# Patient Record
Sex: FEMALE | Race: WHITE | NOT HISPANIC OR LATINO | Employment: OTHER | URBAN - METROPOLITAN AREA
[De-identification: names, ages, dates, MRNs, and addresses within clinical notes are randomized per-mention and may not be internally consistent; named-entity substitution may affect disease eponyms.]

---

## 2019-01-09 LAB — EJECTION FRACTION: NORMAL %

## 2021-05-19 ENCOUNTER — TRANSFERRED RECORDS (OUTPATIENT)
Dept: HEALTH INFORMATION MANAGEMENT | Facility: CLINIC | Age: 71
End: 2021-05-19

## 2022-03-31 ENCOUNTER — TRANSFERRED RECORDS (OUTPATIENT)
Dept: HEALTH INFORMATION MANAGEMENT | Facility: CLINIC | Age: 72
End: 2022-03-31

## 2023-05-22 ENCOUNTER — TRANSFERRED RECORDS (OUTPATIENT)
Dept: HEALTH INFORMATION MANAGEMENT | Facility: CLINIC | Age: 73
End: 2023-05-22
Payer: MEDICARE

## 2023-05-22 ENCOUNTER — MEDICAL CORRESPONDENCE (OUTPATIENT)
Dept: HEALTH INFORMATION MANAGEMENT | Facility: CLINIC | Age: 73
End: 2023-05-22
Payer: MEDICARE

## 2023-05-31 ENCOUNTER — OFFICE VISIT (OUTPATIENT)
Dept: CARDIOLOGY | Facility: CLINIC | Age: 73
End: 2023-05-31
Payer: MEDICARE

## 2023-05-31 VITALS
HEART RATE: 67 BPM | RESPIRATION RATE: 16 BRPM | DIASTOLIC BLOOD PRESSURE: 50 MMHG | SYSTOLIC BLOOD PRESSURE: 90 MMHG | HEIGHT: 66 IN | WEIGHT: 153 LBS | BODY MASS INDEX: 24.59 KG/M2

## 2023-05-31 DIAGNOSIS — I50.22 CHRONIC SYSTOLIC CONGESTIVE HEART FAILURE (H): ICD-10-CM

## 2023-05-31 DIAGNOSIS — R06.09 EXERTIONAL DYSPNEA: ICD-10-CM

## 2023-05-31 DIAGNOSIS — I51.7 LVH (LEFT VENTRICULAR HYPERTROPHY): ICD-10-CM

## 2023-05-31 DIAGNOSIS — I42.8 NONISCHEMIC CARDIOMYOPATHY (H): Primary | ICD-10-CM

## 2023-05-31 DIAGNOSIS — I44.7 LBBB (LEFT BUNDLE BRANCH BLOCK): ICD-10-CM

## 2023-05-31 PROCEDURE — 99204 OFFICE O/P NEW MOD 45 MIN: CPT | Performed by: INTERNAL MEDICINE

## 2023-05-31 RX ORDER — ZOLPIDEM TARTRATE 5 MG/1
5 TABLET ORAL
COMMUNITY

## 2023-05-31 RX ORDER — SACUBITRIL AND VALSARTAN 49; 51 MG/1; MG/1
1 TABLET, FILM COATED ORAL 2 TIMES DAILY
COMMUNITY
Start: 2022-09-22 | End: 2023-08-31

## 2023-05-31 RX ORDER — MIRABEGRON 25 MG/1
1 TABLET, FILM COATED, EXTENDED RELEASE ORAL DAILY
COMMUNITY
Start: 2023-02-07 | End: 2023-08-31 | Stop reason: ALTCHOICE

## 2023-05-31 RX ORDER — METOPROLOL SUCCINATE 200 MG/1
1 TABLET, EXTENDED RELEASE ORAL DAILY
COMMUNITY
Start: 2023-05-16 | End: 2023-05-31

## 2023-05-31 RX ORDER — FUROSEMIDE 20 MG
2 TABLET ORAL DAILY
COMMUNITY
Start: 2022-07-26 | End: 2023-07-10 | Stop reason: DRUGHIGH

## 2023-05-31 RX ORDER — METOPROLOL SUCCINATE 100 MG/1
100 TABLET, EXTENDED RELEASE ORAL DAILY
Qty: 90 TABLET | Refills: 3 | Status: SHIPPED | OUTPATIENT
Start: 2023-05-31 | End: 2023-07-10

## 2023-05-31 RX ORDER — SPIRONOLACTONE 25 MG/1
12.5 TABLET ORAL DAILY
COMMUNITY
Start: 2021-09-03 | End: 2024-05-06

## 2023-05-31 NOTE — PATIENT INSTRUCTIONS
Please contact direct nurses line Monday through Friday 8 AM to 5 PM @ [phone]  After-hours contact cardiology office at [phone].    Reduce metoprolol XL to 100 mg daily   Continue other medications at current dose.    Follow-up in 3 months

## 2023-05-31 NOTE — PROGRESS NOTES
HEART CARE ENCOUNTER CONSULTATON NOTE      [facility]  [phone]      Assessment/Recommendations   Assessment:   1.  Nonischemic cardiomyopathy, heart failure with improved ejection fraction (HFimEF). LVEF:50%. NYHA II.  Because of nonischemic cardiomyopathy likely LV dyssynchrony from left bundle branch block.  2.  Exertional dyspnea.  Fatigue with prolonged walking.  3.  Chronic left bundle branch block, QRS: 138 ms.   4.  Mild left ventricular hypertrophy    Plan:   1. Reduce metoprolol XL to 100 mg daily given exertional intolerance.   2. Lasix 40 mg daily  3. Sacubitril-valsartan (ENTRESTO) 49-51 MG tablet BID (refilled)  4. Aldactone 12.5 mg daily  5. Call instructions provided to nurse including my direct nurses line. On-call cardiology line given.     Follow-up in 3 months        History of Present Illness/Subjective    HPI: Alondra White is a 72 year old female history of nonischemic cardiomyopathy, left bundle branch block, congestive heart failure who presents to [place] physician cardiology office for consultation for management of nonischemic cardiomyopathy.    In 2018, the patient was diagnosed with nonischemic cardiomyopathy in the setting of congestive heart failure with reduced ejection fraction.  Her initial symptoms were dyspnea on exertion and fatigue.  She underwent further evaluation including coronary angiogram which demonstrated normal coronary arteries.  She underwent cardiac MRI which demonstrated normal myocardium with the exception of insertion point fibrosis.  She was placed on guideline directed medical therapy at [facility] including Entresto, metoprolol, spironolactone which resulted in gradual improvement in left ventricular ejection fraction.      Currently she notes dyspnea with prolonged activity.  Prior to diagnosis of congestive heart failure she was able to walk 1 mile near [place] in [place].  Currently she is unable to walk more than half a  "mile before noticing fatigue and some shortness of breath.  She denies any orthopnea or PND symptoms.  No lightheadedness.  No syncope.  No history of ventricular arrhythmias.  She denies any anginal chest pain.  She is compliant with all medications.    Echocardiogram Results: 2022  Summary     1. The estimated ejection fraction is 50%.  The left ventricular size is   normal.  Systolic function is normal.  Wall thickness is mildly increased.   Left ventricular segmental wall motion is grossly normal. Abnormal septal   wall motion related to LBBB.     2. Grade 1 diastolic filling pattern (impaired relaxation with low to   normal   filling pressure).     3. Grossly normal RV size and function.     4. Normal biatrial size.     5. Lipomatous hypertrophy of the interatrial septum.     6. The mitral valve has thickened leaflets.     7. There is mild mitral valve regurgitation.     8. Compared to prior TTE study, the LVEF is unchanged.      Coronary Angiogram: 7/30/2018   Conclusions        1.  Normal coronary arteries. Left dominant.        2.  Normal filling pressures        RA mean 5mmHg        RV 26/6mmHg        PA 25/6, mean 14mmHg        PCWP mean 12mmHg        LVEDP 12mmHg      Cardiac MRI: 2018 ([facility])  Conclusions:  Severe LV systolic dysfunction, EF 27 %. The ventricle is eccentrically   remodeled and wall motion is  dyssynergic.      Areas of delayed enhancement in the septal mid myocardium and at the   septal/RV insertion point. These      are in a nonischemic pattern but are not pathognomonic for any specific   etiology.    No significant valve lesions.    Normal RV systolic function.        Physical Examination  Review of Systems   Vitals: BP 90/50 (BP Location: Right arm, Patient Position: Sitting, Cuff Size: Adult Regular)   Pulse 67   Resp 16   Ht 1.676 m (5' 6\")   Wt 69.4 kg (153 lb)   BMI 24.69 kg/m    BMI= Body mass index is 24.69 kg/m .  Wt Readings from Last 3 Encounters:   05/31/23 " 69.4 kg (153 lb)   07/20/16 68.5 kg (151 lb)   02/25/14 68.3 kg (150 lb 9.6 oz)        Pleasant   ENT/Mouth: membranes moist, no oral lesions or bleeding gums.      EYES:  no scleral icterus, normal conjunctivae       Chest/Lungs:   lungs are clear to auscultation, no rales or wheezing, no sternal scar, equal chest wall expansion    Cardiovascular:   Regular. Normal first and second heart sounds with no murmurs, rubs, or gallops; the carotid, radial and posterior tibial pulses are intact, Jugular venous pressure normal, no edema bilaterally    Abdomen:  no tenderness; bowel sounds are present   Extremities: no cyanosis or clubbing   Skin: no xanthelasma, warm.    Neurologic: normal  bilateral, no tremors     Psychiatric: alert and oriented x3, calm        Please refer above for cardiac ROS details.        Medical History  Surgical History Family History Social History   Past Medical History:   Diagnosis Date     LBBB (left bundle branch block)      Non-ischemic cardiomyopathy (H)      Past Surgical History:   Procedure Laterality Date     ARTHROPLASTY KNEE UNICOMPARTMENT Left 07/20/2016    Procedure: LEFT KNEE MEDIAL UNICOMPARTMENTAL ARTHROPLASTY;  Surgeon: Marko Norris MD;  Location: [facility];  Service:      ARTHROSCOPY KNEE       CV CORONARY ANGIOGRAM  2018    Normal Coronary Arteries.     HYSTERECTOMY       RELEASE CARPAL TUNNEL       TONSILLECTOMY       Family History   Problem Relation Age of Onset     Coronary Artery Disease Mother 71        MI     Chronic Obstructive Pulmonary Disease Father 80     Coronary Artery Disease Father 90        MI     Diabetes Type 2  Sister         Social History     Socioeconomic History     Marital status:      Spouse name: Not on file     Number of children: Not on file     Years of education: Not on file     Highest education level: Not on file   Occupational History     Not on file   Tobacco Use     Smoking status: Never     Smokeless tobacco: Not on file    Vaping Use     Vaping status: Not on file   Substance and Sexual Activity     Alcohol use: Yes     Comment: Alcoholic Drinks/day: one per day     Drug use: No     Sexual activity: Not on file   Other Topics Concern     Not on file   Social History Narrative     Not on file     Social Determinants of Health     Financial Resource Strain: Not on file   Food Insecurity: Not on file   Transportation Needs: Not on file   Physical Activity: Not on file   Stress: Not on file   Social Connections: Not on file   Intimate Partner Violence: Not on file   Housing Stability: Not on file           Medications  Allergies   Current Outpatient Medications   Medication Sig Dispense Refill     aspirin 325 MG EC tablet [ASPIRIN 325 MG EC TABLET] Take 1 tablet (325 mg total) by mouth 2 (two) times a day. Must take for 30 days for dvt prophylaxis. Take with meals  0     clonazePAM (KLONOPIN) 0.5 MG tablet [CLONAZEPAM (KLONOPIN) 0.5 MG TABLET] Take 0.5 mg by mouth 2 (two) times a day as needed for anxiety.       docusate sodium (COLACE) 100 MG capsule [DOCUSATE SODIUM (COLACE) 100 MG CAPSULE] Take 1 capsule (100 mg total) by mouth 2 (two) times a day. 10 capsule 0     DULoxetine (CYMBALTA) 30 MG capsule [DULOXETINE (CYMBALTA) 30 MG CAPSULE] Take 30 mg by mouth daily. At around 1:00pm       DULoxetine (CYMBALTA) 30 MG capsule [DULOXETINE (CYMBALTA) 30 MG CAPSULE] Take 60 mg by mouth daily. In AM       omeprazole (PRILOSEC) 20 MG capsule [OMEPRAZOLE (PRILOSEC) 20 MG CAPSULE] Take 20 mg by mouth daily.       oxybutynin (DITROPAN XL) 10 MG ER tablet [OXYBUTYNIN (DITROPAN XL) 10 MG ER TABLET] Take 10 mg by mouth daily.       psyllium (METAMUCIL) 3.4 gram packet [PSYLLIUM (METAMUCIL) 3.4 GRAM PACKET] Take 1 packet by mouth daily.       senna-docusate (PERICOLACE) 8.6-50 mg tablet [SENNA-DOCUSATE (PERICOLACE) 8.6-50 MG TABLET] Take 1 tablet by mouth 2 (two) times a day. Take daily for constipation or while on narcotics. Hold if developing  loose stools  0     traZODone (DESYREL) 50 MG tablet [TRAZODONE (DESYREL) 50 MG TABLET] Take 100-150 mg by mouth bedtime.          Allergies   Allergen Reactions     Paxil [Paroxetine] Nausea     Zoloft [Sertraline] Nausea          Lab Results    Chemistry/lipid CBC Cardiac Enzymes/BNP/TSH/INR   No results for input(s): CHOL, HDL, LDL, TRIG, CHOLHDLRATIO in the last 91444 hours.  No results for input(s): LDL in the last 77148 hours.  No results for input(s): NA, POTASSIUM, CHLORIDE, CO2, GLC, BUN, CR, GFRESTIMATED, ESTHELA in the last 44444 hours.    Invalid input(s): GRFESTBLACK  No results for input(s): CR in the last 21862 hours.  No results for input(s): A1C in the last 86168 hours.       No results for input(s): WBC, HGB, HCT, MCV, PLT in the last 83464 hours.  No results for input(s): HGB in the last 38904 hours. No results for input(s): TROPONINI in the last 90857 hours.  No results for input(s): BNP, NTBNPI, NTBNP in the last 08247 hours.  No results for input(s): TSH in the last 44263 hours.  No results for input(s): INR in the last 53933 hours.     Jasmeet Chaves DO

## 2023-06-04 ENCOUNTER — HEALTH MAINTENANCE LETTER (OUTPATIENT)
Age: 73
End: 2023-06-04

## 2023-06-16 ENCOUNTER — MYC MEDICAL ADVICE (OUTPATIENT)
Dept: CARDIOLOGY | Facility: CLINIC | Age: 73
End: 2023-06-16
Payer: MEDICARE

## 2023-06-16 DIAGNOSIS — I50.22 CHRONIC SYSTOLIC CONGESTIVE HEART FAILURE (H): Primary | ICD-10-CM

## 2023-06-16 NOTE — TELEPHONE ENCOUNTER
===View-only below this line===  ----- Message -----  From: Jasmeet Chaves DO  Sent: 6/16/2023   3:43 PM CDT  To: Richar Rowley RN  Subject: FW: Scheduled knee replacement surgery           August slot is fine.  Would recommend complete echo.  Indication: HFrEF.      Order placed. My chart message sent. CMM,Rn

## 2023-07-07 ENCOUNTER — TRANSFERRED RECORDS (OUTPATIENT)
Dept: HEALTH INFORMATION MANAGEMENT | Facility: CLINIC | Age: 73
End: 2023-07-07

## 2023-07-10 ENCOUNTER — MYC MEDICAL ADVICE (OUTPATIENT)
Dept: CARDIOLOGY | Facility: CLINIC | Age: 73
End: 2023-07-10
Payer: MEDICARE

## 2023-07-10 DIAGNOSIS — I42.8 NONISCHEMIC CARDIOMYOPATHY (H): Primary | ICD-10-CM

## 2023-07-10 DIAGNOSIS — I44.7 LBBB (LEFT BUNDLE BRANCH BLOCK): ICD-10-CM

## 2023-07-10 RX ORDER — METOPROLOL SUCCINATE 50 MG/1
50 TABLET, EXTENDED RELEASE ORAL DAILY
Qty: 90 TABLET | Refills: 1 | Status: SHIPPED | OUTPATIENT
Start: 2023-07-10 | End: 2024-06-20

## 2023-07-10 RX ORDER — FUROSEMIDE 20 MG
20 TABLET ORAL DAILY
Qty: 90 TABLET | Refills: 1 | Status: SHIPPED | OUTPATIENT
Start: 2023-07-10 | End: 2023-08-31

## 2023-07-10 NOTE — TELEPHONE ENCOUNTER
===View-only below this line===  ----- Message -----  From: Jasmeet Chaves DO  Sent: 7/10/2023   3:07 PM CDT  To: Richar Rowley RN  Subject: FW: Blood pressure test results                  I would have patient reduce lasix to 20 mg daily and reduce metoprolol XL to 50 mg daily.     My chart message sent. New prescriptions sent to [facility] to reflect change. DARLYNRn

## 2023-08-07 ENCOUNTER — HOSPITAL ENCOUNTER (OUTPATIENT)
Dept: CARDIOLOGY | Facility: CLINIC | Age: 73
Discharge: HOME | End: 2023-08-07
Attending: INTERNAL MEDICINE | Admitting: INTERNAL MEDICINE
Payer: MEDICARE

## 2023-08-07 DIAGNOSIS — I50.22 CHRONIC SYSTOLIC CONGESTIVE HEART FAILURE (H): ICD-10-CM

## 2023-08-07 LAB — LVEF ECHO: NORMAL

## 2023-08-07 PROCEDURE — 93306 TTE W/DOPPLER COMPLETE: CPT | Mod: 26 | Performed by: GENERAL ACUTE CARE HOSPITAL

## 2023-08-07 PROCEDURE — 93306 TTE W/DOPPLER COMPLETE: CPT

## 2023-08-21 ENCOUNTER — TRANSFERRED RECORDS (OUTPATIENT)
Dept: HEALTH INFORMATION MANAGEMENT | Facility: CLINIC | Age: 73
End: 2023-08-21
Payer: MEDICARE

## 2023-08-21 LAB
ALT SERPL-CCNC: 28 U/L
AST SERPL-CCNC: 26 U/L (ref 14–36)
CREATININE (EXTERNAL): 0.5 MG/DL (ref 0.7–1.4)
GFR ESTIMATED (EXTERNAL): >60 ML/MIN/1.73M2
GLUCOSE (EXTERNAL): 93 MG/DL (ref 60–99)
POTASSIUM (EXTERNAL): 4.6 MMOL/L (ref 3.5–5.1)

## 2023-08-31 ENCOUNTER — OFFICE VISIT (OUTPATIENT)
Dept: CARDIOLOGY | Facility: CLINIC | Age: 73
End: 2023-08-31
Attending: INTERNAL MEDICINE
Payer: MEDICARE

## 2023-08-31 VITALS
HEART RATE: 100 BPM | SYSTOLIC BLOOD PRESSURE: 106 MMHG | DIASTOLIC BLOOD PRESSURE: 62 MMHG | HEIGHT: 66 IN | BODY MASS INDEX: 24.65 KG/M2 | RESPIRATION RATE: 16 BRPM | WEIGHT: 153.4 LBS

## 2023-08-31 DIAGNOSIS — I42.8 NONISCHEMIC CARDIOMYOPATHY (H): ICD-10-CM

## 2023-08-31 DIAGNOSIS — I50.22 CHRONIC SYSTOLIC CONGESTIVE HEART FAILURE (H): Primary | ICD-10-CM

## 2023-08-31 DIAGNOSIS — I44.7 LBBB (LEFT BUNDLE BRANCH BLOCK): ICD-10-CM

## 2023-08-31 PROCEDURE — 99214 OFFICE O/P EST MOD 30 MIN: CPT | Performed by: INTERNAL MEDICINE

## 2023-08-31 RX ORDER — SOLIFENACIN SUCCINATE 5 MG/1
5 TABLET, FILM COATED ORAL
COMMUNITY
Start: 2023-08-17 | End: 2024-05-06

## 2023-08-31 RX ORDER — ACETAMINOPHEN 500 MG
500 TABLET ORAL EVERY 6 HOURS PRN
COMMUNITY
End: 2024-05-06

## 2023-08-31 RX ORDER — CLOBETASOL PROPIONATE 0.5 MG/G
OINTMENT TOPICAL 2 TIMES DAILY PRN
COMMUNITY
Start: 2023-05-22

## 2023-08-31 RX ORDER — SACUBITRIL AND VALSARTAN 49; 51 MG/1; MG/1
1 TABLET, FILM COATED ORAL 2 TIMES DAILY
Qty: 180 TABLET | Refills: 3 | Status: SHIPPED | OUTPATIENT
Start: 2023-08-31 | End: 2024-05-06

## 2023-08-31 RX ORDER — ATORVASTATIN CALCIUM 10 MG/1
10 TABLET, FILM COATED ORAL DAILY
COMMUNITY

## 2023-08-31 NOTE — PROGRESS NOTES
HEART CARE ENCOUNTER CONSULTATON NOTE      [facility]  [phone]      Assessment/Recommendations   Assessment:   1.  History of nonischemic cardiomyopathy, heart failure with improved ejection fraction (HFimEF). LVEF:60% (resting), NYHA II.  Nonischemic cardiomyopathy likely LV dyssynchrony from left bundle branch block.  2.  Exertional dyspnea (mild chronic and stable).  3.  Chronic left bundle branch block, QRS: 138 ms.   4.  Mild left ventricular hypertrophy    Plan:   1. Metoprolol XL 50 mg daily given exertional intolerance.   2. Off lasix 40 mg daily  3.  Continue Sacubitril-valsartan (ENTRESTO) 49-51 MG tablet BID (refilled)  4. Aldactone 12.5 mg daily      1.  Preoperative cardiovascular risk assessment: Orthopedic surgery  Assessment of preoperative cardiac risk: She has no active cardiac conditions (unstable coronary syndromes, decompensated HF, significant arrhythmias, or severe valvular disease), has no known coronary artery disease, has 1 clinical risk factors (ischemic heart disease, prior heart failure, cerebrovascular disease, diabetes mellitus, and renal insufficiency), and has a functional capacity >4 than 4 METs.       Surgical Recommendation(s):   1. Based on clinical risk and cardiac condition it is recommended that the patient proceeds with operation without further cardiac testing or interventions at this time from a cardiovascular standpoint.   2. Can Hold Entresto in AM of surgery to avoid hypotension.        History of Present Illness/Subjective    HPI: Alondra White is a 72 year old female history of nonischemic cardiomyopathy, left bundle branch block, congestive heart failure who presents to [facility] cardiology office for consultation for management of nonischemic cardiomyopathy.    In 2018, the patient was diagnosed with nonischemic cardiomyopathy in the setting of congestive heart failure with reduced ejection fraction.  Her initial symptoms were  dyspnea on exertion and fatigue.  She underwent further evaluation including coronary angiogram which demonstrated normal coronary arteries.  She underwent cardiac MRI which demonstrated normal myocardium with the exception of insertion point fibrosis.  She was placed on guideline directed medical therapy at [facility] including Entresto, metoprolol, spironolactone which resulted in gradual improvement in left ventricular ejection fraction.  Recent echocardiogram demonstrated normalization of left ventricular function.  Ejection fraction 60%.  Normal left ventricular size.    Currently from a cardiovascular standpoint the patient has been stable.  She currently denies any resting dyspnea, orthopnea or lower extremity edema.  No lightheadedness or palpitations.  She continues to be able to walk about half a mile without any cardiac symptoms.  She plans undergo knee surgery on September 5.      Echo: 2023  1. Left ventricular size, wall thickness and systolic function are normal. The  estimated left ventricular ejection fraction is 55-60%.  2. Right ventricular size and systolic function are normal.  3. No hemodynamically significant valvular abnormalities.  4. No prior study available for comparison.    Echocardiogram Results: 2022  Summary     1. The estimated ejection fraction is 50%.  The left ventricular size is   normal.  Systolic function is normal.  Wall thickness is mildly increased.   Left ventricular segmental wall motion is grossly normal. Abnormal septal   wall motion related to LBBB.     2. Grade 1 diastolic filling pattern (impaired relaxation with low to   normal   filling pressure).     3. Grossly normal RV size and function.     4. Normal biatrial size.     5. Lipomatous hypertrophy of the interatrial septum.     6. The mitral valve has thickened leaflets.     7. There is mild mitral valve regurgitation.     8. Compared to prior TTE study, the LVEF is unchanged.      Coronary Angiogram: 7/30/2018    "Conclusions        1.  Normal coronary arteries. Left dominant.        2.  Normal filling pressures        RA mean 5mmHg        RV 26/6mmHg        PA 25/6, mean 14mmHg        PCWP mean 12mmHg        LVEDP 12mmHg      Cardiac MRI: 2018 ([facility])  Conclusions:  Severe LV systolic dysfunction, EF 27 %. The ventricle is eccentrically   remodeled and wall motion is  dyssynergic.      Areas of delayed enhancement in the septal mid myocardium and at the   septal/RV insertion point. These      are in a nonischemic pattern but are not pathognomonic for any specific   etiology.    No significant valve lesions.    Normal RV systolic function.        Physical Examination  Review of Systems   Vitals: /62 (BP Location: Right arm, Patient Position: Sitting, Cuff Size: Adult Regular)   Pulse 100   Resp 16   Ht 1.676 m (5' 6\")   Wt 69.6 kg (153 lb 6.4 oz)   BMI 24.76 kg/m    BMI= Body mass index is 24.76 kg/m .  Wt Readings from Last 3 Encounters:   08/31/23 69.6 kg (153 lb 6.4 oz)   05/31/23 69.4 kg (153 lb)   07/20/16 68.5 kg (151 lb)        Pleasant   ENT/Mouth: membranes moist, no oral lesions or bleeding gums.      EYES:  no scleral icterus, normal conjunctivae       Chest/Lungs:   lungs are clear to auscultation, no rales or wheezing, no sternal scar, equal chest wall expansion    Cardiovascular:   Regular. Normal first and second heart sounds with no murmurs, rubs, or gallops; the carotid, radial and posterior tibial pulses are intact, Jugular venous pressure normal, no edema bilaterally. No change.    Abdomen:  no tenderness; bowel sounds are present   Extremities: no cyanosis or clubbing   Skin: no xanthelasma, warm.    Neurologic: normal  bilateral, no tremors     Psychiatric: alert and oriented x3, calm        Please refer above for cardiac ROS details.        Medical History  Surgical History Family History Social History   Past Medical History:   Diagnosis Date    LBBB (left bundle branch block)  "    Non-ischemic cardiomyopathy (H)      Past Surgical History:   Procedure Laterality Date    ARTHROPLASTY KNEE UNICOMPARTMENT Left 07/20/2016    Procedure: LEFT KNEE MEDIAL UNICOMPARTMENTAL ARTHROPLASTY;  Surgeon: Marko Norris MD;  Location: [facility] OR;  Service:     ARTHROSCOPY KNEE      CV CORONARY ANGIOGRAM  2018    Normal Coronary Arteries.    HYSTERECTOMY      RELEASE CARPAL TUNNEL      TONSILLECTOMY       Family History   Problem Relation Age of Onset    Coronary Artery Disease Mother 71        MI    Chronic Obstructive Pulmonary Disease Father 80    Coronary Artery Disease Father 90        MI    Diabetes Type 2  Sister         Social History     Socioeconomic History    Marital status:      Spouse name: Not on file    Number of children: Not on file    Years of education: Not on file    Highest education level: Not on file   Occupational History    Not on file   Tobacco Use    Smoking status: Never    Smokeless tobacco: Never   Vaping Use    Vaping Use: Never used   Substance and Sexual Activity    Alcohol use: Yes     Comment: Alcoholic Drinks/day: one per day    Drug use: No    Sexual activity: Not on file   Other Topics Concern    Not on file   Social History Narrative    Not on file     Social Determinants of Health     Financial Resource Strain: Not on file   Food Insecurity: Not on file   Transportation Needs: Not on file   Physical Activity: Not on file   Stress: Not on file   Social Connections: Not on file   Intimate Partner Violence: Not on file   Housing Stability: Not on file           Medications  Allergies   Current Outpatient Medications   Medication Sig Dispense Refill    acetaminophen (TYLENOL) 500 MG tablet Take 500 mg by mouth as needed      atorvastatin (LIPITOR) 10 MG tablet Take 10 mg by mouth daily      clobetasol (TEMOVATE) 0.05 % external ointment Apply topically 2 times daily      clonazePAM (KLONOPIN) 0.5 MG tablet [CLONAZEPAM (KLONOPIN) 0.5 MG TABLET] Take 0.5 mg by  mouth 2 (two) times a day as needed for anxiety.      diclofenac (VOLTAREN) 1 % topical gel Apply 2 g topically 4 times daily as needed      DULoxetine (CYMBALTA) 30 MG capsule Take 30 mg by mouth every evening      DULoxetine (CYMBALTA) 60 MG capsule Take 60 mg by mouth every morning      metoprolol succinate ER (TOPROL XL) 50 MG 24 hr tablet Take 1 tablet (50 mg) by mouth daily 90 tablet 1    omeprazole (PRILOSEC) 20 MG capsule [OMEPRAZOLE (PRILOSEC) 20 MG CAPSULE] Take 20 mg by mouth daily.      oxybutynin (DITROPAN XL) 10 MG ER tablet [OXYBUTYNIN (DITROPAN XL) 10 MG ER TABLET] Take 10 mg by mouth daily.      psyllium (METAMUCIL) 3.4 gram packet [PSYLLIUM (METAMUCIL) 3.4 GRAM PACKET] Take 1 packet by mouth daily.      sacubitril-valsartan (ENTRESTO) 49-51 MG per tablet Take 1 tablet by mouth 2 times daily      solifenacin (VESICARE) 5 MG tablet Take 5 mg by mouth daily at 2 pm      spironolactone (ALDACTONE) 25 MG tablet Take 12.5 mg by mouth daily      zolpidem (AMBIEN) 5 MG tablet Take 5 mg by mouth nightly as needed      docusate sodium (COLACE) 100 MG capsule [DOCUSATE SODIUM (COLACE) 100 MG CAPSULE] Take 1 capsule (100 mg total) by mouth 2 (two) times a day. 10 capsule 0       Allergies   Allergen Reactions    Paxil [Paroxetine] Nausea    Zoloft [Sertraline] Nausea          Lab Results    Chemistry/lipid CBC Cardiac Enzymes/BNP/TSH/INR   No results for input(s): CHOL, HDL, LDL, TRIG, CHOLHDLRATIO in the last 36131 hours.  No results for input(s): LDL in the last 51690 hours.  No results for input(s): NA, POTASSIUM, CHLORIDE, CO2, GLC, BUN, CR, GFRESTIMATED, ESTHELA in the last 58977 hours.    Invalid input(s): GRFESTBLACK  No results for input(s): CR in the last 50910 hours.  No results for input(s): A1C in the last 26748 hours.       No results for input(s): WBC, HGB, HCT, MCV, PLT in the last 57691 hours.  No results for input(s): HGB in the last 31915 hours. No results for input(s): TROPONINI in the last  29998 hours.  No results for input(s): BNP, NTBNPI, NTBNP in the last 32180 hours.  No results for input(s): TSH in the last 98752 hours.  No results for input(s): INR in the last 16053 hours.     Jasmeet Chaves, DO

## 2023-09-06 ENCOUNTER — ANESTHESIA (OUTPATIENT)
Dept: SURGERY | Facility: CLINIC | Age: 73
End: 2023-09-06
Payer: MEDICARE

## 2023-09-06 ENCOUNTER — HOSPITAL ENCOUNTER (OUTPATIENT)
Facility: CLINIC | Age: 73
Discharge: HOME | End: 2023-09-07
Attending: ORTHOPAEDIC SURGERY | Admitting: ORTHOPAEDIC SURGERY
Payer: MEDICARE

## 2023-09-06 ENCOUNTER — ANESTHESIA EVENT (OUTPATIENT)
Dept: SURGERY | Facility: CLINIC | Age: 73
End: 2023-09-06
Payer: MEDICARE

## 2023-09-06 DIAGNOSIS — M17.11 PRIMARY OSTEOARTHRITIS OF RIGHT KNEE: Primary | ICD-10-CM

## 2023-09-06 PROBLEM — M19.90 OA (OSTEOARTHRITIS): Status: ACTIVE | Noted: 2023-09-06

## 2023-09-06 PROBLEM — R35.0 URINARY FREQUENCY: Status: ACTIVE | Noted: 2023-09-06

## 2023-09-06 LAB — GLUCOSE BLDC GLUCOMTR-MCNC: 183 MG/DL (ref 70–99)

## 2023-09-06 PROCEDURE — 258N000003 HC RX IP 258 OP 636: Performed by: NURSE ANESTHETIST, CERTIFIED REGISTERED

## 2023-09-06 PROCEDURE — 250N000013 HC RX MED GY IP 250 OP 250 PS 637: Performed by: FAMILY MEDICINE

## 2023-09-06 PROCEDURE — 99204 OFFICE O/P NEW MOD 45 MIN: CPT | Performed by: FAMILY MEDICINE

## 2023-09-06 PROCEDURE — 258N000003 HC RX IP 258 OP 636: Performed by: ORTHOPAEDIC SURGERY

## 2023-09-06 PROCEDURE — 250N000013 HC RX MED GY IP 250 OP 250 PS 637: Performed by: PHYSICIAN ASSISTANT

## 2023-09-06 PROCEDURE — C1776 JOINT DEVICE (IMPLANTABLE): HCPCS | Performed by: ORTHOPAEDIC SURGERY

## 2023-09-06 PROCEDURE — 82962 GLUCOSE BLOOD TEST: CPT

## 2023-09-06 PROCEDURE — 370N000017 HC ANESTHESIA TECHNICAL FEE, PER MIN: Performed by: ORTHOPAEDIC SURGERY

## 2023-09-06 PROCEDURE — 272N000001 HC OR GENERAL SUPPLY STERILE: Performed by: ORTHOPAEDIC SURGERY

## 2023-09-06 PROCEDURE — 250N000011 HC RX IP 250 OP 636: Performed by: ANESTHESIOLOGY

## 2023-09-06 PROCEDURE — 710N000010 HC RECOVERY PHASE 1, LEVEL 2, PER MIN: Performed by: ORTHOPAEDIC SURGERY

## 2023-09-06 PROCEDURE — 258N000001 HC RX 258: Performed by: ORTHOPAEDIC SURGERY

## 2023-09-06 PROCEDURE — 250N000011 HC RX IP 250 OP 636: Mod: JZ | Performed by: ORTHOPAEDIC SURGERY

## 2023-09-06 PROCEDURE — 250N000013 HC RX MED GY IP 250 OP 250 PS 637: Performed by: ORTHOPAEDIC SURGERY

## 2023-09-06 PROCEDURE — 250N000011 HC RX IP 250 OP 636: Performed by: NURSE ANESTHETIST, CERTIFIED REGISTERED

## 2023-09-06 PROCEDURE — 258N000003 HC RX IP 258 OP 636: Performed by: ANESTHESIOLOGY

## 2023-09-06 PROCEDURE — C1713 ANCHOR/SCREW BN/BN,TIS/BN: HCPCS | Performed by: ORTHOPAEDIC SURGERY

## 2023-09-06 PROCEDURE — 999N000141 HC STATISTIC PRE-PROCEDURE NURSING ASSESSMENT: Performed by: ORTHOPAEDIC SURGERY

## 2023-09-06 PROCEDURE — 360N000077 HC SURGERY LEVEL 4, PER MIN: Performed by: ORTHOPAEDIC SURGERY

## 2023-09-06 PROCEDURE — 250N000011 HC RX IP 250 OP 636: Performed by: PHYSICIAN ASSISTANT

## 2023-09-06 PROCEDURE — 250N000009 HC RX 250: Performed by: PHYSICIAN ASSISTANT

## 2023-09-06 DEVICE — SIMPLEX® HV IS A FAST-SETTING ACRYLIC RESIN FOR USE IN BONE SURGERY. MIXING THE TWO SEPARATE STERILE COMPONENTS PRODUCES A DUCTILE BONE CEMENT WHICH, AFTER HARDENING, FIXES THE IMPLANT AND TRANSFERS STRESSES PRODUCED DURING MOVEMENT EVENLY TO THE BONE. SIMPLEX® HV CEMENT POWDER ALSO CONTAINS INSOLUBLE ZIRCONIUM DIOXIDE AS AN X-RAY CONTRAST MEDIUM. SIMPLEX® HV DOES NOT EMIT A SIGNAL AND DOES NOT POSE A SAFETY RISK IN A MAGNETIC RESONANCE ENVIRONMENT.
Type: IMPLANTABLE DEVICE | Site: KNEE | Status: FUNCTIONAL
Brand: SIMPLEX HV

## 2023-09-06 DEVICE — LEGION CR NONPOROUS FEMORAL SZ  4 RT
Type: IMPLANTABLE DEVICE | Site: KNEE | Status: FUNCTIONAL
Brand: LEGION

## 2023-09-06 DEVICE — GENESIS II NON-POROUS TIBIAL                                    BASEPLATE SIZE 3 RIGHT
Type: IMPLANTABLE DEVICE | Site: KNEE | Status: FUNCTIONAL
Brand: GENESIS II

## 2023-09-06 DEVICE — GENESIS II RESURFACING PATELLAR                                    PROSTHESIS  29MM
Type: IMPLANTABLE DEVICE | Site: KNEE | Status: FUNCTIONAL
Brand: GENESIS II

## 2023-09-06 DEVICE — LEGION CRUCIATE RETAINING HIGH                                    FLEX HIGHLY CROSS LINKED                                    POLYETHYLENE SIZE 3-4 13MM
Type: IMPLANTABLE DEVICE | Site: KNEE | Status: FUNCTIONAL
Brand: LEGION

## 2023-09-06 RX ORDER — CEFAZOLIN SODIUM 1 G/3ML
1 INJECTION, POWDER, FOR SOLUTION INTRAMUSCULAR; INTRAVENOUS EVERY 8 HOURS
Status: COMPLETED | OUTPATIENT
Start: 2023-09-06 | End: 2023-09-07

## 2023-09-06 RX ORDER — DULOXETIN HYDROCHLORIDE 30 MG/1
30 CAPSULE, DELAYED RELEASE ORAL EVERY EVENING
Status: DISCONTINUED | OUTPATIENT
Start: 2023-09-06 | End: 2023-09-07 | Stop reason: HOSPADM

## 2023-09-06 RX ORDER — AMOXICILLIN 250 MG
1 CAPSULE ORAL 2 TIMES DAILY
Status: DISCONTINUED | OUTPATIENT
Start: 2023-09-06 | End: 2023-09-07 | Stop reason: HOSPADM

## 2023-09-06 RX ORDER — PROPOFOL 10 MG/ML
INJECTION, EMULSION INTRAVENOUS CONTINUOUS PRN
Status: DISCONTINUED | OUTPATIENT
Start: 2023-09-06 | End: 2023-09-06

## 2023-09-06 RX ORDER — HYDROMORPHONE HCL IN WATER/PF 6 MG/30 ML
0.4 PATIENT CONTROLLED ANALGESIA SYRINGE INTRAVENOUS EVERY 5 MIN PRN
Status: DISCONTINUED | OUTPATIENT
Start: 2023-09-06 | End: 2023-09-06 | Stop reason: HOSPADM

## 2023-09-06 RX ORDER — OXYCODONE HYDROCHLORIDE 5 MG/1
10 TABLET ORAL EVERY 4 HOURS PRN
Status: DISCONTINUED | OUTPATIENT
Start: 2023-09-06 | End: 2023-09-07 | Stop reason: HOSPADM

## 2023-09-06 RX ORDER — HYDROMORPHONE HCL IN WATER/PF 6 MG/30 ML
0.4 PATIENT CONTROLLED ANALGESIA SYRINGE INTRAVENOUS
Status: DISCONTINUED | OUTPATIENT
Start: 2023-09-06 | End: 2023-09-07 | Stop reason: HOSPADM

## 2023-09-06 RX ORDER — CEFAZOLIN SODIUM/WATER 2 G/20 ML
2 SYRINGE (ML) INTRAVENOUS
Status: COMPLETED | OUTPATIENT
Start: 2023-09-06 | End: 2023-09-06

## 2023-09-06 RX ORDER — NALOXONE HYDROCHLORIDE 0.4 MG/ML
0.4 INJECTION, SOLUTION INTRAMUSCULAR; INTRAVENOUS; SUBCUTANEOUS
Status: DISCONTINUED | OUTPATIENT
Start: 2023-09-06 | End: 2023-09-07 | Stop reason: HOSPADM

## 2023-09-06 RX ORDER — POLYETHYLENE GLYCOL 3350 17 G/17G
17 POWDER, FOR SOLUTION ORAL DAILY
Status: DISCONTINUED | OUTPATIENT
Start: 2023-09-06 | End: 2023-09-07 | Stop reason: HOSPADM

## 2023-09-06 RX ORDER — FENTANYL CITRATE 50 UG/ML
50 INJECTION, SOLUTION INTRAMUSCULAR; INTRAVENOUS EVERY 5 MIN PRN
Status: DISCONTINUED | OUTPATIENT
Start: 2023-09-06 | End: 2023-09-06 | Stop reason: HOSPADM

## 2023-09-06 RX ORDER — MEPERIDINE HYDROCHLORIDE 25 MG/ML
12.5 INJECTION INTRAMUSCULAR; INTRAVENOUS; SUBCUTANEOUS EVERY 5 MIN PRN
Status: DISCONTINUED | OUTPATIENT
Start: 2023-09-06 | End: 2023-09-06 | Stop reason: HOSPADM

## 2023-09-06 RX ORDER — FENTANYL CITRATE 50 UG/ML
25 INJECTION, SOLUTION INTRAMUSCULAR; INTRAVENOUS EVERY 5 MIN PRN
Status: DISCONTINUED | OUTPATIENT
Start: 2023-09-06 | End: 2023-09-06 | Stop reason: HOSPADM

## 2023-09-06 RX ORDER — POLYETHYLENE GLYCOL 3350 17 G/17G
1 POWDER, FOR SOLUTION ORAL DAILY PRN
COMMUNITY

## 2023-09-06 RX ORDER — NALOXONE HYDROCHLORIDE 0.4 MG/ML
0.2 INJECTION, SOLUTION INTRAMUSCULAR; INTRAVENOUS; SUBCUTANEOUS
Status: DISCONTINUED | OUTPATIENT
Start: 2023-09-06 | End: 2023-09-07 | Stop reason: HOSPADM

## 2023-09-06 RX ORDER — ONDANSETRON 4 MG/1
4 TABLET, ORALLY DISINTEGRATING ORAL EVERY 6 HOURS PRN
Status: DISCONTINUED | OUTPATIENT
Start: 2023-09-06 | End: 2023-09-07 | Stop reason: HOSPADM

## 2023-09-06 RX ORDER — SODIUM CHLORIDE, SODIUM LACTATE, POTASSIUM CHLORIDE, CALCIUM CHLORIDE 600; 310; 30; 20 MG/100ML; MG/100ML; MG/100ML; MG/100ML
INJECTION, SOLUTION INTRAVENOUS CONTINUOUS
Status: DISCONTINUED | OUTPATIENT
Start: 2023-09-06 | End: 2023-09-06 | Stop reason: HOSPADM

## 2023-09-06 RX ORDER — HALOPERIDOL 5 MG/ML
1 INJECTION INTRAMUSCULAR
Status: DISCONTINUED | OUTPATIENT
Start: 2023-09-06 | End: 2023-09-06 | Stop reason: HOSPADM

## 2023-09-06 RX ORDER — DULOXETIN HYDROCHLORIDE 60 MG/1
60 CAPSULE, DELAYED RELEASE ORAL EVERY MORNING
Status: DISCONTINUED | OUTPATIENT
Start: 2023-09-07 | End: 2023-09-07 | Stop reason: HOSPADM

## 2023-09-06 RX ORDER — ATORVASTATIN CALCIUM 10 MG/1
10 TABLET, FILM COATED ORAL EVERY EVENING
Status: DISCONTINUED | OUTPATIENT
Start: 2023-09-07 | End: 2023-09-07 | Stop reason: HOSPADM

## 2023-09-06 RX ORDER — ZOLPIDEM TARTRATE 5 MG/1
5 TABLET ORAL
Status: DISCONTINUED | OUTPATIENT
Start: 2023-09-06 | End: 2023-09-07 | Stop reason: HOSPADM

## 2023-09-06 RX ORDER — ONDANSETRON 2 MG/ML
4 INJECTION INTRAMUSCULAR; INTRAVENOUS EVERY 30 MIN PRN
Status: DISCONTINUED | OUTPATIENT
Start: 2023-09-06 | End: 2023-09-06 | Stop reason: HOSPADM

## 2023-09-06 RX ORDER — TRANEXAMIC ACID 650 MG/1
1950 TABLET ORAL ONCE
Status: COMPLETED | OUTPATIENT
Start: 2023-09-06 | End: 2023-09-06

## 2023-09-06 RX ORDER — BUPIVACAINE HYDROCHLORIDE 5 MG/ML
INJECTION, SOLUTION EPIDURAL; INTRACAUDAL
Status: COMPLETED | OUTPATIENT
Start: 2023-09-06 | End: 2023-09-06

## 2023-09-06 RX ORDER — SODIUM CHLORIDE, SODIUM LACTATE, POTASSIUM CHLORIDE, CALCIUM CHLORIDE 600; 310; 30; 20 MG/100ML; MG/100ML; MG/100ML; MG/100ML
INJECTION, SOLUTION INTRAVENOUS CONTINUOUS
Status: DISCONTINUED | OUTPATIENT
Start: 2023-09-06 | End: 2023-09-07 | Stop reason: HOSPADM

## 2023-09-06 RX ORDER — ONDANSETRON 2 MG/ML
INJECTION INTRAMUSCULAR; INTRAVENOUS PRN
Status: DISCONTINUED | OUTPATIENT
Start: 2023-09-06 | End: 2023-09-06

## 2023-09-06 RX ORDER — PANTOPRAZOLE SODIUM 40 MG/1
40 TABLET, DELAYED RELEASE ORAL DAILY
Status: DISCONTINUED | OUTPATIENT
Start: 2023-09-06 | End: 2023-09-07 | Stop reason: HOSPADM

## 2023-09-06 RX ORDER — BISACODYL 10 MG
10 SUPPOSITORY, RECTAL RECTAL DAILY PRN
Status: DISCONTINUED | OUTPATIENT
Start: 2023-09-06 | End: 2023-09-07 | Stop reason: HOSPADM

## 2023-09-06 RX ORDER — LIDOCAINE 40 MG/G
CREAM TOPICAL
Status: DISCONTINUED | OUTPATIENT
Start: 2023-09-06 | End: 2023-09-07 | Stop reason: HOSPADM

## 2023-09-06 RX ORDER — HYDROMORPHONE HCL IN WATER/PF 6 MG/30 ML
0.2 PATIENT CONTROLLED ANALGESIA SYRINGE INTRAVENOUS
Status: DISCONTINUED | OUTPATIENT
Start: 2023-09-06 | End: 2023-09-07 | Stop reason: HOSPADM

## 2023-09-06 RX ORDER — ACETAMINOPHEN 325 MG/1
975 TABLET ORAL ONCE
Status: COMPLETED | OUTPATIENT
Start: 2023-09-06 | End: 2023-09-06

## 2023-09-06 RX ORDER — SPIRONOLACTONE 25 MG
12.5 TABLET ORAL DAILY
Status: DISCONTINUED | OUTPATIENT
Start: 2023-09-07 | End: 2023-09-07 | Stop reason: HOSPADM

## 2023-09-06 RX ORDER — ASPIRIN 81 MG/1
81 TABLET ORAL 2 TIMES DAILY
Status: DISCONTINUED | OUTPATIENT
Start: 2023-09-06 | End: 2023-09-07 | Stop reason: HOSPADM

## 2023-09-06 RX ORDER — ACETAMINOPHEN 325 MG/1
650 TABLET ORAL EVERY 4 HOURS PRN
Status: DISCONTINUED | OUTPATIENT
Start: 2023-09-09 | End: 2023-09-07 | Stop reason: HOSPADM

## 2023-09-06 RX ORDER — METOPROLOL SUCCINATE 50 MG/1
50 TABLET, EXTENDED RELEASE ORAL EVERY EVENING
Status: DISCONTINUED | OUTPATIENT
Start: 2023-09-07 | End: 2023-09-07 | Stop reason: HOSPADM

## 2023-09-06 RX ORDER — LIDOCAINE 40 MG/G
CREAM TOPICAL
Status: DISCONTINUED | OUTPATIENT
Start: 2023-09-06 | End: 2023-09-06

## 2023-09-06 RX ORDER — SODIUM CHLORIDE, SODIUM LACTATE, POTASSIUM CHLORIDE, CALCIUM CHLORIDE 600; 310; 30; 20 MG/100ML; MG/100ML; MG/100ML; MG/100ML
INJECTION, SOLUTION INTRAVENOUS CONTINUOUS
Status: DISCONTINUED | OUTPATIENT
Start: 2023-09-06 | End: 2023-09-06

## 2023-09-06 RX ORDER — DEXAMETHASONE SODIUM PHOSPHATE 10 MG/ML
INJECTION, SOLUTION INTRAMUSCULAR; INTRAVENOUS PRN
Status: DISCONTINUED | OUTPATIENT
Start: 2023-09-06 | End: 2023-09-06

## 2023-09-06 RX ORDER — FENTANYL CITRATE 50 UG/ML
25-100 INJECTION, SOLUTION INTRAMUSCULAR; INTRAVENOUS
Status: DISCONTINUED | OUTPATIENT
Start: 2023-09-06 | End: 2023-09-06

## 2023-09-06 RX ORDER — PROCHLORPERAZINE MALEATE 5 MG
5 TABLET ORAL EVERY 6 HOURS PRN
Status: DISCONTINUED | OUTPATIENT
Start: 2023-09-06 | End: 2023-09-07 | Stop reason: HOSPADM

## 2023-09-06 RX ORDER — OXYBUTYNIN CHLORIDE 5 MG/1
5 TABLET, EXTENDED RELEASE ORAL DAILY
Status: DISCONTINUED | OUTPATIENT
Start: 2023-09-06 | End: 2023-09-07 | Stop reason: HOSPADM

## 2023-09-06 RX ORDER — CALCIUM CARBONATE/VITAMIN D3 600 MG-10
1 TABLET ORAL 2 TIMES DAILY
COMMUNITY

## 2023-09-06 RX ORDER — POLYETHYLENE GLYCOL 3350 17 G/17G
17 POWDER, FOR SOLUTION ORAL DAILY
Status: DISCONTINUED | OUTPATIENT
Start: 2023-09-07 | End: 2023-09-06

## 2023-09-06 RX ORDER — ONDANSETRON 4 MG/1
4 TABLET, ORALLY DISINTEGRATING ORAL EVERY 30 MIN PRN
Status: DISCONTINUED | OUTPATIENT
Start: 2023-09-06 | End: 2023-09-06 | Stop reason: HOSPADM

## 2023-09-06 RX ORDER — ACETAMINOPHEN 325 MG/1
975 TABLET ORAL EVERY 8 HOURS
Status: DISCONTINUED | OUTPATIENT
Start: 2023-09-06 | End: 2023-09-07 | Stop reason: HOSPADM

## 2023-09-06 RX ORDER — ONDANSETRON 2 MG/ML
4 INJECTION INTRAMUSCULAR; INTRAVENOUS EVERY 6 HOURS PRN
Status: DISCONTINUED | OUTPATIENT
Start: 2023-09-06 | End: 2023-09-07 | Stop reason: HOSPADM

## 2023-09-06 RX ORDER — SOLIFENACIN SUCCINATE 5 MG/1
5 TABLET, FILM COATED ORAL
Status: DISCONTINUED | OUTPATIENT
Start: 2023-09-06 | End: 2023-09-07 | Stop reason: HOSPADM

## 2023-09-06 RX ORDER — OXYCODONE HYDROCHLORIDE 5 MG/1
5 TABLET ORAL EVERY 4 HOURS PRN
Status: DISCONTINUED | OUTPATIENT
Start: 2023-09-06 | End: 2023-09-07 | Stop reason: HOSPADM

## 2023-09-06 RX ORDER — HYDROMORPHONE HCL IN WATER/PF 6 MG/30 ML
0.2 PATIENT CONTROLLED ANALGESIA SYRINGE INTRAVENOUS EVERY 5 MIN PRN
Status: DISCONTINUED | OUTPATIENT
Start: 2023-09-06 | End: 2023-09-06 | Stop reason: HOSPADM

## 2023-09-06 RX ORDER — CEFAZOLIN SODIUM/WATER 2 G/20 ML
2 SYRINGE (ML) INTRAVENOUS SEE ADMIN INSTRUCTIONS
Status: DISCONTINUED | OUTPATIENT
Start: 2023-09-06 | End: 2023-09-06

## 2023-09-06 RX ORDER — CLONAZEPAM 0.5 MG/1
0.5 TABLET ORAL
Status: DISCONTINUED | OUTPATIENT
Start: 2023-09-06 | End: 2023-09-07 | Stop reason: HOSPADM

## 2023-09-06 RX ADMIN — SODIUM CHLORIDE, POTASSIUM CHLORIDE, SODIUM LACTATE AND CALCIUM CHLORIDE: 600; 310; 30; 20 INJECTION, SOLUTION INTRAVENOUS at 13:10

## 2023-09-06 RX ADMIN — DULOXETINE 30 MG: 30 CAPSULE, DELAYED RELEASE ORAL at 20:38

## 2023-09-06 RX ADMIN — BUPIVACAINE HYDROCHLORIDE 15 ML: 5 INJECTION, SOLUTION EPIDURAL; INTRACAUDAL; PERINEURAL at 13:14

## 2023-09-06 RX ADMIN — TRANEXAMIC ACID 1950 MG: 650 TABLET ORAL at 11:25

## 2023-09-06 RX ADMIN — PROPOFOL 80 MCG/KG/MIN: 10 INJECTION, EMULSION INTRAVENOUS at 14:02

## 2023-09-06 RX ADMIN — ZOLPIDEM TARTRATE 5 MG: 5 TABLET ORAL at 23:18

## 2023-09-06 RX ADMIN — PHENYLEPHRINE HYDROCHLORIDE 200 MCG: 10 INJECTION INTRAVENOUS at 15:02

## 2023-09-06 RX ADMIN — CEFAZOLIN 1 G: 1 INJECTION, POWDER, FOR SOLUTION INTRAMUSCULAR; INTRAVENOUS at 21:50

## 2023-09-06 RX ADMIN — OXYCODONE HYDROCHLORIDE 5 MG: 5 TABLET ORAL at 19:43

## 2023-09-06 RX ADMIN — HYDROMORPHONE HYDROCHLORIDE 0.4 MG: 0.2 INJECTION, SOLUTION INTRAMUSCULAR; INTRAVENOUS; SUBCUTANEOUS at 20:37

## 2023-09-06 RX ADMIN — DEXAMETHASONE SODIUM PHOSPHATE 6 MG: 10 INJECTION, SOLUTION INTRAMUSCULAR; INTRAVENOUS at 14:04

## 2023-09-06 RX ADMIN — Medication 1 TABLET: at 20:38

## 2023-09-06 RX ADMIN — Medication 2 G: at 13:53

## 2023-09-06 RX ADMIN — ONDANSETRON 4 MG: 2 INJECTION INTRAMUSCULAR; INTRAVENOUS at 14:02

## 2023-09-06 RX ADMIN — Medication 1 PACKET: at 17:40

## 2023-09-06 RX ADMIN — ACETAMINOPHEN 975 MG: 325 TABLET ORAL at 21:50

## 2023-09-06 RX ADMIN — ASPIRIN 81 MG: 81 TABLET, COATED ORAL at 20:38

## 2023-09-06 RX ADMIN — SODIUM CHLORIDE, POTASSIUM CHLORIDE, SODIUM LACTATE AND CALCIUM CHLORIDE: 600; 310; 30; 20 INJECTION, SOLUTION INTRAVENOUS at 15:05

## 2023-09-06 RX ADMIN — PHENYLEPHRINE HYDROCHLORIDE 200 MCG: 10 INJECTION INTRAVENOUS at 14:52

## 2023-09-06 RX ADMIN — PHENYLEPHRINE HYDROCHLORIDE 0.2 MCG/KG/MIN: 10 INJECTION INTRAVENOUS at 14:06

## 2023-09-06 RX ADMIN — MIDAZOLAM HYDROCHLORIDE 1 MG: 1 INJECTION, SOLUTION INTRAMUSCULAR; INTRAVENOUS at 13:09

## 2023-09-06 RX ADMIN — SENNOSIDES AND DOCUSATE SODIUM 1 TABLET: 50; 8.6 TABLET ORAL at 20:38

## 2023-09-06 RX ADMIN — ACETAMINOPHEN 975 MG: 325 TABLET ORAL at 11:25

## 2023-09-06 RX ADMIN — SODIUM CHLORIDE, POTASSIUM CHLORIDE, SODIUM LACTATE AND CALCIUM CHLORIDE: 600; 310; 30; 20 INJECTION, SOLUTION INTRAVENOUS at 16:36

## 2023-09-06 RX ADMIN — FENTANYL CITRATE 50 MCG: 50 INJECTION, SOLUTION INTRAMUSCULAR; INTRAVENOUS at 13:09

## 2023-09-06 RX ADMIN — POLYETHYLENE GLYCOL 3350 17 G: 17 POWDER, FOR SOLUTION ORAL at 17:40

## 2023-09-06 RX ADMIN — SODIUM CHLORIDE, POTASSIUM CHLORIDE, SODIUM LACTATE AND CALCIUM CHLORIDE: 600; 310; 30; 20 INJECTION, SOLUTION INTRAVENOUS at 18:18

## 2023-09-06 RX ADMIN — SACUBITRIL AND VALSARTAN 1 TABLET: 49; 51 TABLET, FILM COATED ORAL at 20:38

## 2023-09-06 RX ADMIN — PHENYLEPHRINE HYDROCHLORIDE 100 MCG: 10 INJECTION INTRAVENOUS at 14:32

## 2023-09-06 RX ADMIN — PANTOPRAZOLE SODIUM 40 MG: 40 TABLET, DELAYED RELEASE ORAL at 17:40

## 2023-09-06 ASSESSMENT — ACTIVITIES OF DAILY LIVING (ADL)
ADLS_ACUITY_SCORE: 24
ADLS_ACUITY_SCORE: 26
ADLS_ACUITY_SCORE: 26
ADLS_ACUITY_SCORE: 24
ADLS_ACUITY_SCORE: 33
ADLS_ACUITY_SCORE: 26
ADLS_ACUITY_SCORE: 26

## 2023-09-06 NOTE — ANESTHESIA PREPROCEDURE EVALUATION
Anesthesia Pre-Procedure Evaluation    Patient: Alondra White   MRN: [id number] : 1950        Procedure : Procedure(s):  RIGHT TOTAL KNEE ARTHROPLASTY          Past Medical History:   Diagnosis Date    Anemia     Arthritis     Congestive heart failure (H)     Gastroesophageal reflux disease     History of blood transfusion     Hypertension     LBBB (left bundle branch block)     Non-ischemic cardiomyopathy (H)       Past Surgical History:   Procedure Laterality Date    ARTHROPLASTY KNEE UNICOMPARTMENT Left 2016    Procedure: LEFT KNEE MEDIAL UNICOMPARTMENTAL ARTHROPLASTY;  Surgeon: Marko Norris MD;  Location: [facility] OR;  Service:     ARTHROSCOPY KNEE      CV CORONARY ANGIOGRAM  2018    Normal Coronary Arteries.    HYSTERECTOMY      RELEASE CARPAL TUNNEL      TONSILLECTOMY        Allergies   Allergen Reactions    Paxil [Paroxetine] Nausea    Zoloft [Sertraline] Nausea      Social History     Tobacco Use    Smoking status: Never    Smokeless tobacco: Never   Substance Use Topics    Alcohol use: Yes     Comment: Alcoholic Drinks/day: one per day      Wt Readings from Last 1 Encounters:   23 68.4 kg (150 lb 11.2 oz)        Anesthesia Evaluation   Pt has had prior anesthetic.     No history of anesthetic complications       ROS/MED HX  ENT/Pulmonary:  - neg pulmonary ROS     Neurologic:  - neg neurologic ROS     Cardiovascular: Comment: LBBB    (+) Dyslipidemia hypertension- -   -  - -      CHF                           Previous cardiac testing   Echo: Date: 23 Results:  Interpretation Summary     1. Left ventricular size, wall thickness and systolic function are normal. The  estimated left ventricular ejection fraction is 55-60%.  2. Right ventricular size and systolic function are normal.  3. No hemodynamically significant valvular abnormalities.  4. No prior study available for comparison.    Stress Test:  Date: Results:    ECG Reviewed:  Date: 3/31/22 Results:  Sinus  tachycardia  LBBB  Cath:  Date: Results:      METS/Exercise Tolerance:     Hematologic:     (+)      anemia,          Musculoskeletal:   (+)  arthritis,             GI/Hepatic:     (+) GERD, Asymptomatic on medication,                  Renal/Genitourinary:  - neg Renal ROS     Endo:  - neg endo ROS     Psychiatric/Substance Use:     (+) psychiatric history anxiety and depression       Infectious Disease:  - neg infectious disease ROS     Malignancy:  - neg malignancy ROS     Other:  - neg other ROS          Physical Exam    Airway  airway exam normal      Mallampati: II   TM distance: > 3 FB   Neck ROM: full   Mouth opening: > 3 cm    Respiratory Devices and Support         Dental  no notable dental history     (+) Minor Abnormalities - some fillings, tiny chips      Cardiovascular   cardiovascular exam normal       Rhythm and rate: regular and normal     Pulmonary   pulmonary exam normal        breath sounds clear to auscultation           OUTSIDE LABS:  CBC: No results found for: WBC, HGB, HCT, PLT  BMP: No results found for: NA, POTASSIUM, CHLORIDE, CO2, BUN, CR, GLC  COAGS: No results found for: PTT, INR, FIBR  POC: No results found for: BGM, HCG, HCGS  HEPATIC: No results found for: ALBUMIN, PROTTOTAL, ALT, AST, GGT, ALKPHOS, BILITOTAL, BILIDIRECT, LYDIA  OTHER: No results found for: PH, LACT, A1C, ESTHELA, PHOS, MAG, LIPASE, AMYLASE, TSH, T4, T3, CRP, SED    Anesthesia Plan    ASA Status:  2    NPO Status:  NPO Appropriate    Anesthesia Type: Spinal.              Consents    Anesthesia Plan(s) and associated risks, benefits, and realistic alternatives discussed. Questions answered and patient/representative(s) expressed understanding.     - Discussed:     - Discussed with:  Patient            Postoperative Care    Pain management: IV analgesics, Oral pain medications, Multi-modal analgesia, Peripheral nerve block (Single Shot).   PONV prophylaxis: Ondansetron (or other 5HT-3), Dexamethasone or Solumedrol,  Droperidol or Haldol     Comments:    Other Comments: Chart reviewed, including labs.  I reviewed the options and risks of a spinal with the patient, including but not limited to: bleeding, infection, damage to tissues under the skin (nerves, muscles, blood vessels), hypotension and headache. The patient's questions were answered and agrees to a spinal.    Plan for pre op adductor canal block               GRETTA KIDD MD

## 2023-09-06 NOTE — PHARMACY-ADMISSION MEDICATION HISTORY
Pharmacist Admission Medication History    Admission medication history is complete. The information provided in this note is only as accurate as the sources available at the time of the update.    Medication reconciliation/reorder completed by provider prior to medication history? No    Information Source(s): Patient and CareEverywhere/SureScripts via in-person    Pertinent Information: -    Changes made to PTA medication list:  Added: Ca/Vit D, Miralax  Deleted: docusate  Changed: oxybutynin from 10mg    Allergies reviewed with patient and updates made in EHR: yes    Medication History Completed By: Kenzie Markham PharmD 9/6/2023 12:03 PM    Prior to Admission medications    Medication Sig Last Dose Taking? Auth Provider Long Term End Date   acetaminophen (TYLENOL) 500 MG tablet Take 500 mg by mouth every 6 hours as needed Unknown at prn Yes Reported, Patient     atorvastatin (LIPITOR) 10 MG tablet Take 10 mg by mouth daily 9/5/2023 Yes Reported, Patient Yes    calcium carbonate-vitamin D (CALTRATE) 600-10 MG-MCG per tablet Take 1 tablet by mouth 2 times daily 9/5/2023 Yes Unknown, Entered By History     clobetasol (TEMOVATE) 0.05 % external ointment Apply topically 2 times daily as needed Unknown at prn - has with Yes Reported, Patient     clonazePAM (KLONOPIN) 0.5 MG tablet Take 0.5 mg by mouth nightly as needed Unknown at prn Yes Provider, Historical     diclofenac (VOLTAREN) 1 % topical gel Apply 2 g topically 4 times daily as needed Unknown at prn Yes Reported, Patient     DULoxetine (CYMBALTA) 30 MG capsule Take 30 mg by mouth every evening 9/5/2023 Yes Provider, Historical     DULoxetine (CYMBALTA) 60 MG capsule Take 60 mg by mouth every morning 9/5/2023 Yes Provider, Historical     metoprolol succinate ER (TOPROL XL) 50 MG 24 hr tablet Take 1 tablet (50 mg) by mouth daily 9/5/2023 at pm Yes Jasmeet Chaves, DO Yes    omeprazole (PRILOSEC) 20 MG capsule [OMEPRAZOLE (PRILOSEC) 20 MG CAPSULE] Take  20 mg by mouth daily. 9/5/2023 Yes Provider, Historical     oxyBUTYnin ER (DITROPAN XL) 5 MG 24 hr tablet Take 5 mg by mouth daily 9/5/2023 Yes Provider, Historical     polyethylene glycol (MIRALAX) 17 g packet Take 1 packet by mouth daily 9/5/2023 Yes Unknown, Entered By History     psyllium (METAMUCIL) 3.4 gram packet [PSYLLIUM (METAMUCIL) 3.4 GRAM PACKET] Take 1 packet by mouth daily. 9/5/2023 Yes Provider, Historical     sacubitril-valsartan (ENTRESTO) 49-51 MG per tablet Take 1 tablet by mouth 2 times daily 9/5/2023 Yes Jasmeet Chaves, DO Yes    solifenacin (VESICARE) 5 MG tablet Take 5 mg by mouth daily at 2 pm 9/5/2023 Yes Reported, Patient     spironolactone (ALDACTONE) 25 MG tablet Take 12.5 mg by mouth daily 9/5/2023 Yes Reported, Patient Yes    zolpidem (AMBIEN) 5 MG tablet Take 5 mg by mouth nightly as needed 9/5/2023 Yes Reported, Patient

## 2023-09-06 NOTE — OP NOTE
DATE OF SERVICE: 9/6/2023     PREOPERATIVE DIAGNOSIS: Right knee osteoarthritis.     POSTOPERATIVE DIAGNOSIS: Right knee osteoarthritis.     Operation : Right Total Knee Arthroplasty    ANESTHESIA: Spinal .     PREP: Routine.     SURGEON: Andrew Perez MD.     ASSISTANT: Anthony Madrigal PA-C.     INDICATION: This patient is a  72 year old year-old person with osteoarthritis of right knee.   they has failed nonsurgical treatment. Risks and benefits were discussed regarding   surgical treatment.     The patient was brought to the operating room, placed supine, given a spinal   anesthetic, was given perioperative antibiotics.right leg was elevated, prepped   and draped in normal sterile fashion. Appropriate timeout was completed.   Tourniquet was inflated.    Findings: Tricompartmental arthritis with varus deformity, large osteophytes, popliteal cyst    An incision was made just above the patella in tibial tubercle. Medial retinacular approach  then completed. She had large effusion which was evacuated. On inspection she had   grade 4 changes in the medial and patellofemoral compartments. Osteophytes were   excised. Next, after retractor was placed, a drill hole was placed in the distal   femur. Intramedullary guide was used and a 5 degree valgus cut of 10 mm of bone was   created. A sizing guide was placed. A size 4 implant was felt to be adequate in 3   degrees external rotation. Anterior, posterior and chamfer cuts were completed.   Next, attention was brought to the tibial side. Using external alignment guide, 4   mm of medial bone and 6 mm of lateral bone resected. PCL was spared. Meniscal   tissue was excised.   With a size 3 tibia, size 4  femur and a 13 mm insert the patient had full   extension with matched flexion and extension gaps. Alignment rods were used in   appropriate varus valgus.   Next, the patella was then cut at 29 mm with excellent patellofemoral tracking.   At this point in time the trial  implants were removed. The tibia and femur were   prepared. After copious irrigation and drying using Simplex cement, the size 3  [employer] baseplate was impacted. A size 13 CR insert was impacted, the size   4 femur was impacted, knee brought into extension, patella cemented, cement   cured. No problems identified. Knee was then copiously irrigated, infiltrated with   remaining part of the orthopedic injection. The tourniquet was released and bleeding controlled.The capsule closed  with 1 Vicryl suture, 2-0 Vicryl and Monocryl stitch. The knee was then injected with TXA.  Sterile compressive dressing.   The patient was then brought to recovery in stable condition. All sponge and   needle counts correct. No intraoperative complications identified. Blood loss was   approximately  25 mL. No specimens sent.    KYLIE HOLLINGSWORTH MD   9/6/2023           6

## 2023-09-06 NOTE — ANESTHESIA PROCEDURE NOTES
Adductor canal Procedure Note    Pre-Procedure   Staff -        Anesthesiologist:  Vinicio Ashton MD       Performed By: anesthesiologist       Location: pre-op       Procedure Start/Stop Times: 9/6/2023 1:10 PM and 9/6/2023 1:14 PM       Pre-Anesthestic Checklist: patient identified, IV checked, site marked, risks and benefits discussed, informed consent, monitors and equipment checked, pre-op evaluation, at physician/surgeon's request and post-op pain management  Timeout:       Correct Patient: Yes        Correct Procedure: Yes        Correct Site: Yes        Correct Position: Yes        Correct Laterality: Yes        Site Marked: Yes  Procedure Documentation  Procedure: Adductor canal       Laterality: right       Patient Position: supine       Patient Prep/Sterile Barriers: sterile gloves, mask       Skin prep: Chloraprep       Needle Type: insulated       Needle Gauge: 20.        Needle Length (Inches): 4        Ultrasound guided       1. Ultrasound was used to identify targeted nerve, plexus, vascular marker, or fascial plane and place a needle adjacent to it in real-time.       2. Ultrasound was used to visualize the spread of anesthetic in close proximity to the above referenced structure.       3. A permanent image is entered into the patient's record.       4. The visualized anatomic structures appeared normal.       5. There were no apparent abnormal pathologic findings.    Assessment/Narrative         The placement was negative for: blood aspirated, painful injection and site bleeding       Paresthesias: No.       Bolus given via needle..        Secured via.        Insertion/Infusion Method: Single Shot       Complications: none       Injection made incrementally with aspirations every 5 mL.    Medication(s) Administered   Bupivacaine 0.5% PF (Infiltration) - Infiltration   15 mL - 9/6/2023 1:14:00 PM  Medication Administration Time: 9/6/2023 1:10 PM     Comments:  Negative aspiration every 5 ml of  "medication administered. No signs of LAST. No pain or paresthesias with block placement. No complications.         FOR [facility] ONLY:   Pain Team Contact information: please page the Pain Team Via [place]. Search \"Pain\". During daytime hours, please page the attending first. At night please page the resident first.      "

## 2023-09-06 NOTE — CONSULTS
[facility] MEDICINE CONSULT NOTE   Physician requesting consult: Andrew Perez,*    Reason for consult: Postoperative medical management of medical co-morbidities as below    Identification/Summary:   Alondra White is a 72 year old female with a PMH of nonischemic cardiomyopathy, HTN, anemia history of transfusions, GERD, incontinence and depression anxiety.  Underwent right total knee arthroplasty.     Assessment and Plan:    Status post right total knee arthroplasty  Postoperative management per orthopedics.  Nonischemic cardiomyopathy  Essential hypertension  8/7/2023 echocardiogram shows EF 55-60 %.  No other significant abnormalities.  Order home metoprolol and Entresto with hold parameters.  Order to resume spironolactone on 9/7.  Depression anxiety  Order home antidepressants.  GERD  Order omeprazole.  Urinary incontinence  Hold her Ditropan and Vesicare at this time.  Monitor voiding.  Can resume if incontinence returns.  Hyperlipidemia  Order home Lipitor.  Borderline sleep apnea  In the past it was recommended the patient have a sleep study but she has not yet done so.  Does have some intermittent snoring.  Monitor pulse oximetry per standard protocols.  Supplemental oxygen as needed.    Anticoagulation.  Aspirin 81 mg twice daily ordered by surgery.  Routine postoperative risk.  COVID-19 PCR not tested per current policy  Noted  Fluids: Per orthopedics  Pain meds: Per orthopedics  Therapy: Per orthopedics  Shaw:Not present  Lines: None       Current Diet  Orders Placed This Encounter      Advance Diet as Tolerated: Regular Diet Adult      Discharge Instruction - Regular Diet Adult    Supplements  None      Disposition  Per orthopedics    Code status:Full Code   [facility] service was asked to evaluate patient for postoperative medical management as follows below. Please resume the home medications as reconciled and further noted with ordered hold parameters.  Thank you  for this consult; we will continue to follow this patient until discharge.    Procedure(s):  RIGHT TOTAL KNEE ARTHROPLASTY  Day of Surgery  Estimated Blood Loss:  25 mL  Hospital Problem List   No problem-specific Assessment & Plan notes found for this encounter.    Principal Problem:    OA (osteoarthritis)      -Reviewed the patient's preoperative H and P and updated missing elements.  -Home medication reconciliation has been reviewed.  Medications have been ordered as noted from the home list and changes are documented above     Clinically Significant Risk Factors Present on Admission                   # Chronic heart failure with preserved ejection fraction: heart failure noted on problem list and last echo with EF >50%                HISTORY     Alondra White is a 72 year old female with PMH of nonischemic cardiomyopathy, HTN, anemia history of transfusions, GERD, incontinence and depression anxiety.  Underwent right total knee arthroplasty.  Patient doing well postoperatively.  No chest pain.  No shortness of breath.  No nausea or vomiting.  No lightheadedness or dizziness.   present and supportive.  Unknown etiology to her cardiomyopathy but appears to be stable according to the patient.  Has been quite sometime since she last had a transfusion.  Her urinary frequency is well managed with her current medications.  Feels that her mental health is doing well.  Denies personal history of heart attack, stroke, cancer, diabetes, DVT, PE, peptic ulcer disease.  Notes that she may have a history of mild sleep apnea.  Was told to get a sleep study but has not yet done so.  Questions answered to verbalized satisfaction.    Past Medical History     Past Medical History:  No date: Anemia  No date: Arthritis  No date: Congestive heart failure (H)  No date: Gastroesophageal reflux disease  No date: History of blood transfusion  No date: Hypertension  No date: LBBB (left bundle branch block)  No date: Non-ischemic  cardiomyopathy (H)     Patient Active Problem List    Diagnosis Date Noted    OA (osteoarthritis) 09/06/2023     Priority: Medium    Chronic systolic congestive heart failure (H) 05/31/2023     Priority: Medium    Exertional dyspnea 05/31/2023     Priority: Medium    Nonischemic cardiomyopathy (H) 05/31/2023     Priority: Medium    LBBB (left bundle branch block) 05/31/2023     Priority: Medium    Status post left unicompartmental knee replacement 07/20/2016     Priority: Medium    Gastritis      Priority: Medium    Anxiety      Priority: Medium    Depression      Priority: Medium    Anemia      Priority: Medium     Surgical History     Past Surgical History:   Procedure Laterality Date    ARTHROPLASTY KNEE UNICOMPARTMENT Left 07/20/2016    Procedure: LEFT KNEE MEDIAL UNICOMPARTMENTAL ARTHROPLASTY;  Surgeon: Marko Norris MD;  Location: [facility];  Service:     ARTHROSCOPY KNEE      CV CORONARY ANGIOGRAM  2018    Normal Coronary Arteries.    HYSTERECTOMY      RELEASE CARPAL TUNNEL      TONSILLECTOMY       Family History      Family History   Problem Relation Age of Onset    Coronary Artery Disease Mother 71        MI    Chronic Obstructive Pulmonary Disease Father 80    Coronary Artery Disease Father 90        MI    Diabetes Type 2  Sister       Social History      Social History     Tobacco Use    Smoking status: Never    Smokeless tobacco: Never   Vaping Use    Vaping Use: Never used   Substance Use Topics    Alcohol use: Yes     Comment: Alcoholic Drinks/day: one per day    Drug use: No      Allergies     Allergies   Allergen Reactions    Paxil [Paroxetine] Nausea    Zoloft [Sertraline] Nausea       Prior to Admission Medications      Prior to Admission Medications   Prescriptions Last Dose Informant Patient Reported? Taking?   DULoxetine (CYMBALTA) 30 MG capsule 9/5/2023  Yes Yes   Sig: Take 30 mg by mouth every evening   DULoxetine (CYMBALTA) 60 MG capsule 9/5/2023  Yes Yes   Sig: Take 60 mg by mouth  every morning   acetaminophen (TYLENOL) 500 MG tablet Unknown at prn  Yes Yes   Sig: Take 500 mg by mouth every 6 hours as needed   atorvastatin (LIPITOR) 10 MG tablet 9/5/2023  Yes Yes   Sig: Take 10 mg by mouth daily   calcium carbonate-vitamin D (CALTRATE) 600-10 MG-MCG per tablet 9/5/2023  Yes Yes   Sig: Take 1 tablet by mouth 2 times daily   clobetasol (TEMOVATE) 0.05 % external ointment Unknown at prn - has with  Yes Yes   Sig: Apply topically 2 times daily as needed   clonazePAM (KLONOPIN) 0.5 MG tablet Unknown at prn  Yes Yes   Sig: Take 0.5 mg by mouth nightly as needed   diclofenac (VOLTAREN) 1 % topical gel Unknown at prn  Yes Yes   Sig: Apply 2 g topically 4 times daily as needed   metoprolol succinate ER (TOPROL XL) 50 MG 24 hr tablet 9/5/2023 at pm  No Yes   Sig: Take 1 tablet (50 mg) by mouth daily   omeprazole (PRILOSEC) 20 MG capsule 9/5/2023  Yes Yes   Sig: [OMEPRAZOLE (PRILOSEC) 20 MG CAPSULE] Take 20 mg by mouth daily.   oxyBUTYnin ER (DITROPAN XL) 5 MG 24 hr tablet 9/5/2023  Yes Yes   Sig: Take 5 mg by mouth daily   polyethylene glycol (MIRALAX) 17 g packet 9/5/2023  Yes Yes   Sig: Take 1 packet by mouth daily   psyllium (METAMUCIL) 3.4 gram packet 9/5/2023  Yes Yes   Sig: [PSYLLIUM (METAMUCIL) 3.4 GRAM PACKET] Take 1 packet by mouth daily.   sacubitril-valsartan (ENTRESTO) 49-51 MG per tablet 9/5/2023  No Yes   Sig: Take 1 tablet by mouth 2 times daily   solifenacin (VESICARE) 5 MG tablet 9/5/2023  Yes Yes   Sig: Take 5 mg by mouth daily at 2 pm   spironolactone (ALDACTONE) 25 MG tablet 9/5/2023  Yes Yes   Sig: Take 12.5 mg by mouth daily   zolpidem (AMBIEN) 5 MG tablet 9/5/2023  Yes Yes   Sig: Take 5 mg by mouth nightly as needed      Facility-Administered Medications: None      Review of Systems     A 12 point comprehensive review of systems was negative except as noted above in HPI.    OBJECTIVE         Physical Exam   Temp:  [97.2  F (36.2  C)-99.7  F (37.6  C)] 97.2  F (36.2  C)  Pulse:   [61-96] 75  Resp:  [12-40] 16  BP: (108-126)/(51-71) 117/55  SpO2:  [95 %-100 %] 96 %  Body mass index is 24.32 kg/m .  Constitutional: awake, alert, cooperative, no apparent distress, and appears stated age  Eyes: Lids and lashes normal, pupils equal, round and reactive to light, extra ocular muscles intact, sclera clear, conjunctiva normal  ENT: Normocephalic, without obvious abnormality, atraumatic, sinuses nontender on palpation, external ears without lesions, oral pharynx with moist mucous membranes, tonsils without erythema or exudates, gums normal and good dentition.  Hematologic / Lymphatic: no cervical lymphadenopathy and no supraclavicular lymphadenopathy  Respiratory: No increased work of breathing, good air exchange, clear to auscultation bilaterally, no crackles or wheezing  Cardiovascular: Normal apical impulse, regular rate and rhythm, normal S1 and S2, no S3 or S4, and no murmur noted  GI: No scars, normal bowel sounds, soft, non-distended, non-tender, no masses palpated, no hepatosplenomegally  Skin: normal skin color, texture, turgor, no redness, warmth, or swelling, and no rashes  Musculoskeletal: There is no redness, warmth, or swelling of the joints.  Full range of motion noted.  Motor strength is 5 out of 5 all extremities bilaterally.  Tone is normal. no lower extremity pitting edema present  Neurologic: Cranial nerves II-XII are grossly intact. Sensory:  Sensory intact.  Decreased sensation lower extremities bilaterally secondary to nerve block.  Neuropsychiatric: General: normal, calm, and normal eye contact Level of consciousness: alert / normal Affect: normal Orientation: oriented to self, place, time and situation Memory and insight: normal, memory for past and recent events intact, and thought process normal      Cardiographics Reviewed Personally By Myself         Echocardiogram:   Echocardiogram Complete  Order: [id number]  Status: Edited Result - FINAL       Visible to patient: Yes  "(seen)       Next appt: 2023 at 07:30 AM in Physical Therapy (Tomas Gunn, PT)       Dx: Chronic systolic congestive heart maria t...    3 Result Notes       1 Patient Communication  Details    Reading Physician Reading Date Result Priority   Aayush Biggs MD  [phone] 2023      Narrative & Impression  [id number]  [id number]  [id number]  [id number]^KAYLEEN^TIMI^[place] [address]     Name: LUIS MIGUEL WALSH  MRN: [id number]  : 1950  Study Date: 2023 12:26 PM  Age: 72 yrs  Gender: Female  Patient Location: [facility]  Reason For Study: Chronic systolic congestive heart failure (H)  Ordering Physician: TIMI BEYER  Referring Physician: TIMI BEYER  Performed By: MITUL     BSA: 1.8 m2  Height: 66 in  Weight: 153 lb  BP: 118/59 mmHg  ______________________________________________________________________________  Procedure  Complete Echo Adult. Adequate quality two-dimensional was performed and  interpreted. Adequate quality color and spectral Doppler were performed and  interpreted. There is no comparison study available.  ______________________________________________________________________________  Interpretation Summary     1. Left ventricular size, wall thickness and systolic function are normal. The  estimated left ventricular ejection fraction is 55-60%.  2. Right ventricular size and systolic function are normal.  3. No hemodynamically significant valvular abnormalities.  4. No prior study available for comparison.       Imaging Reviewed Personally By Myself      Radiology Results:   Recent Results (from the past 24 hour(s))   POC US Guidance Needle Placement    Narrative    Ultrasound was performed as guidance to an anesthesia procedure.  Click   \"PACS images\" hyperlink below to view any stored images.  For specific   procedure details, view procedure note authored by anesthesia.       Labs Reviewed Personally By Myself " "    Results for orders placed or performed during the hospital encounter of 09/06/23 (from the past 24 hour(s))   POC US Guidance Needle Placement    Narrative    Ultrasound was performed as guidance to an anesthesia procedure.  Click   \"PACS images\" hyperlink below to view any stored images.  For specific   procedure details, view procedure note authored by anesthesia.       Preoperative Labs Reviewed Personally By Myself       8/21/2023 WBCs 8.3, hemoglobin 12.8, platelets 367, glucose 93, albumin 4.2, chloride 103, potassium 4.6, sodium 137, bicarb 25, creatinine 0.5, BUN 15, calcium 9.4, total bili 0.4, AST 26, ALT 28, alk phos 57      Thank you for this consultation.  Appreciate the opportunity to participate in the care of Alondra White, please feel free to contact us for any questions or concerns.    Ac Shook MD  [facility]  Phone: #[phone]    "

## 2023-09-06 NOTE — ANESTHESIA CARE TRANSFER NOTE
Patient: Alondra White    Procedure: Procedure(s):  RIGHT TOTAL KNEE ARTHROPLASTY       Diagnosis: Osteoarthritis of knee [M17.9]  Diagnosis Additional Information: No value filed.    Anesthesia Type:   Spinal     Note:    Oropharynx: oropharynx clear of all foreign objects  Level of Consciousness: awake  Oxygen Supplementation: face mask  Level of Supplemental Oxygen (L/min / FiO2): 8  Independent Airway: airway patency satisfactory and stable  Dentition: dentition unchanged  Vital Signs Stable: post-procedure vital signs reviewed and stable  Report to RN Given: handoff report given  Patient transferred to: PACU    Handoff Report: Identifed the Patient, Identified the Reponsible Provider, Reviewed the pertinent medical history, Discussed the surgical course, Reviewed Intra-OP anesthesia mangement and issues during anesthesia, Set expectations for post-procedure period and Allowed opportunity for questions and acknowledgement of understanding      Vitals:  Vitals Value Taken Time   /51 09/06/23 1530   Temp 37.6  C (99.7  F) 09/06/23 1530   Pulse 84 09/06/23 1531   Resp 18 09/06/23 1531   SpO2 100 % 09/06/23 1531   Vitals shown include unvalidated device data.    Electronically Signed By: KATLYN MORGAN CRNA  September 6, 2023  3:32 PM

## 2023-09-07 ENCOUNTER — APPOINTMENT (OUTPATIENT)
Dept: PHYSICAL THERAPY | Facility: CLINIC | Age: 73
End: 2023-09-07
Attending: ORTHOPAEDIC SURGERY
Payer: MEDICARE

## 2023-09-07 VITALS
OXYGEN SATURATION: 94 % | TEMPERATURE: 97.8 F | DIASTOLIC BLOOD PRESSURE: 53 MMHG | HEIGHT: 66 IN | SYSTOLIC BLOOD PRESSURE: 108 MMHG | WEIGHT: 150.7 LBS | HEART RATE: 79 BPM | RESPIRATION RATE: 16 BRPM | BODY MASS INDEX: 24.22 KG/M2

## 2023-09-07 PROBLEM — R73.01 IMPAIRED FASTING GLUCOSE: Status: ACTIVE | Noted: 2023-09-07

## 2023-09-07 LAB
FASTING STATUS PATIENT QL REPORTED: NO
GLUCOSE BLD-MCNC: 166 MG/DL (ref 70–125)
GLUCOSE BLDC GLUCOMTR-MCNC: 121 MG/DL (ref 70–99)
HBA1C MFR BLD: 5.9 %
HGB BLD-MCNC: 10.3 G/DL (ref 11.7–15.7)

## 2023-09-07 PROCEDURE — 36415 COLL VENOUS BLD VENIPUNCTURE: CPT | Performed by: ORTHOPAEDIC SURGERY

## 2023-09-07 PROCEDURE — 250N000011 HC RX IP 250 OP 636: Performed by: ORTHOPAEDIC SURGERY

## 2023-09-07 PROCEDURE — 83036 HEMOGLOBIN GLYCOSYLATED A1C: CPT | Performed by: FAMILY MEDICINE

## 2023-09-07 PROCEDURE — 258N000003 HC RX IP 258 OP 636: Performed by: ORTHOPAEDIC SURGERY

## 2023-09-07 PROCEDURE — 97116 GAIT TRAINING THERAPY: CPT | Mod: GP

## 2023-09-07 PROCEDURE — 250N000013 HC RX MED GY IP 250 OP 250 PS 637: Performed by: ORTHOPAEDIC SURGERY

## 2023-09-07 PROCEDURE — 999N000111 HC STATISTIC OT IP EVAL DEFER

## 2023-09-07 PROCEDURE — 85018 HEMOGLOBIN: CPT | Performed by: ORTHOPAEDIC SURGERY

## 2023-09-07 PROCEDURE — 99214 OFFICE O/P EST MOD 30 MIN: CPT | Performed by: FAMILY MEDICINE

## 2023-09-07 PROCEDURE — 250N000013 HC RX MED GY IP 250 OP 250 PS 637: Performed by: FAMILY MEDICINE

## 2023-09-07 PROCEDURE — 82947 ASSAY GLUCOSE BLOOD QUANT: CPT | Mod: 91 | Performed by: FAMILY MEDICINE

## 2023-09-07 PROCEDURE — 97110 THERAPEUTIC EXERCISES: CPT | Mod: GP

## 2023-09-07 PROCEDURE — 82962 GLUCOSE BLOOD TEST: CPT

## 2023-09-07 PROCEDURE — 97161 PT EVAL LOW COMPLEX 20 MIN: CPT | Mod: GP

## 2023-09-07 RX ORDER — ASPIRIN 81 MG/1
81 TABLET ORAL 2 TIMES DAILY
Qty: 84 TABLET | Refills: 0 | Status: SHIPPED | OUTPATIENT
Start: 2023-09-07 | End: 2023-10-19

## 2023-09-07 RX ORDER — OXYCODONE HYDROCHLORIDE 5 MG/1
5-10 TABLET ORAL EVERY 4 HOURS PRN
Qty: 33 TABLET | Refills: 0 | Status: SHIPPED | OUTPATIENT
Start: 2023-09-07 | End: 2024-05-06

## 2023-09-07 RX ADMIN — Medication 1 PACKET: at 08:26

## 2023-09-07 RX ADMIN — SPIRONOLACTONE 12.5 MG: 25 TABLET ORAL at 08:50

## 2023-09-07 RX ADMIN — OXYCODONE HYDROCHLORIDE 5 MG: 5 TABLET ORAL at 02:05

## 2023-09-07 RX ADMIN — DULOXETINE HYDROCHLORIDE 60 MG: 60 CAPSULE, DELAYED RELEASE PELLETS ORAL at 06:18

## 2023-09-07 RX ADMIN — SODIUM CHLORIDE, POTASSIUM CHLORIDE, SODIUM LACTATE AND CALCIUM CHLORIDE: 600; 310; 30; 20 INJECTION, SOLUTION INTRAVENOUS at 04:23

## 2023-09-07 RX ADMIN — CEFAZOLIN 1 G: 1 INJECTION, POWDER, FOR SOLUTION INTRAMUSCULAR; INTRAVENOUS at 06:18

## 2023-09-07 RX ADMIN — ACETAMINOPHEN 975 MG: 325 TABLET ORAL at 06:18

## 2023-09-07 RX ADMIN — SENNOSIDES AND DOCUSATE SODIUM 1 TABLET: 50; 8.6 TABLET ORAL at 08:25

## 2023-09-07 RX ADMIN — PANTOPRAZOLE SODIUM 40 MG: 40 TABLET, DELAYED RELEASE ORAL at 06:18

## 2023-09-07 RX ADMIN — ASPIRIN 81 MG: 81 TABLET, COATED ORAL at 08:25

## 2023-09-07 RX ADMIN — OXYCODONE HYDROCHLORIDE 5 MG: 5 TABLET ORAL at 08:53

## 2023-09-07 RX ADMIN — HYDROMORPHONE HYDROCHLORIDE 0.2 MG: 0.2 INJECTION, SOLUTION INTRAMUSCULAR; INTRAVENOUS; SUBCUTANEOUS at 03:58

## 2023-09-07 RX ADMIN — POLYETHYLENE GLYCOL 3350 17 G: 17 POWDER, FOR SOLUTION ORAL at 08:26

## 2023-09-07 RX ADMIN — Medication 1 TABLET: at 08:26

## 2023-09-07 ASSESSMENT — ACTIVITIES OF DAILY LIVING (ADL)
ADLS_ACUITY_SCORE: 28
ADLS_ACUITY_SCORE: 26
ADLS_ACUITY_SCORE: 28
ADLS_ACUITY_SCORE: 26
ADLS_ACUITY_SCORE: 25

## 2023-09-07 NOTE — PROGRESS NOTES
"[facility] Progress Note      Post-operative Day: 1 Day Post-Op    Procedure(s):  RIGHT TOTAL KNEE ARTHROPLASTY        Plan: Anticoagulation protocol: Aspirin 81 mg BID  x 42  days            Pain medications:  scopainmedication: oxycodone and tylenol            Weight bearing status:  WBAT            Disposition:  home today with support             Continue cares and rehabilitation     Subjective:  Alondra is seated in her recliner. She reports doing well today, has passed PT and does not need OT services today.   Pain: mild to moderate  Chest pain, SOB:  No  Denies lightheadedness/dizziness  Denies nausea/ vomiting  Passing flatus  voiding well    Objective:  Blood pressure 108/53, pulse 79, temperature 97.8  F (36.6  C), temperature source Oral, resp. rate 16, height 1.676 m (5' 6\"), weight 68.4 kg (150 lb 11.2 oz), SpO2 94 %.    Patient Vitals for the past 24 hrs:   BP Temp Temp src Pulse Resp SpO2 Height Weight   09/07/23 0831 108/53 97.8  F (36.6  C) Oral 79 16 94 % -- --   09/07/23 0330 108/57 97.2  F (36.2  C) Oral 74 16 93 % -- --   09/06/23 2330 112/59 97.9  F (36.6  C) Oral 83 16 93 % -- --   09/06/23 1930 113/56 98  F (36.7  C) Oral 91 16 91 % -- --   09/06/23 1830 115/57 -- Oral 89 16 94 % -- --   09/06/23 1730 116/59 97.6  F (36.4  C) Oral 78 16 94 % -- --   09/06/23 1700 120/56 97.9  F (36.6  C) Oral 77 16 97 % -- --   09/06/23 1630 117/55 97.2  F (36.2  C) Oral 75 16 96 % -- --   09/06/23 1614 -- -- -- -- -- 98 % -- --   09/06/23 1610 118/71 -- -- 84 16 98 % -- --   09/06/23 1600 111/58 -- -- 83 16 95 % -- --   09/06/23 1550 112/56 -- -- 84 24 98 % -- --   09/06/23 1540 111/59 -- -- 96 16 98 % -- --   09/06/23 1530 109/51 99.7  F (37.6  C) -- 86 22 100 % -- --   09/06/23 1340 113/60 -- -- 67 (!) 35 99 % -- --   09/06/23 1335 110/55 -- -- 61 28 99 % -- --   09/06/23 1330 112/58 -- -- 64 (!) 40 99 % -- --   09/06/23 1320 108/59 -- -- 67 16 99 % -- --   09/06/23 1315 116/64 -- -- 66 14 99 % " "-- --   09/06/23 1310 116/58 -- -- 70 16 100 % -- --   09/06/23 1309 120/58 -- -- 74 12 100 % -- --   09/06/23 1137 126/58 98.1  F (36.7  C) Temporal 74 16 98 % -- --   09/06/23 1130 -- -- -- -- -- -- 1.676 m (5' 6\") 68.4 kg (150 lb 11.2 oz)       Wt Readings from Last 4 Encounters:   09/06/23 68.4 kg (150 lb 11.2 oz)   08/31/23 69.6 kg (153 lb 6.4 oz)   05/31/23 69.4 kg (153 lb)   07/20/16 68.5 kg (151 lb)         Motor function, sensation, and circulation intact   Yes  Wound status: Aquacel is clean, without shadowing, dry, and intact. Yes  Calf tenderness: Bilateral  No    Pertinent Labs   Lab Results: personally reviewed.     No results for input(s): INR, HGB, HCT, MCV, PLT, NA, CRP in the last 95053 hours.    Invalid input(s):  WBC, K, GLU, SEDRATE    Report completed by:  KATLYN Monsalve CNP  Date: 9/7/2023       "

## 2023-09-07 NOTE — PROGRESS NOTES
Pt is not appropriate for skilled OT services at this time due to pt independent with ADL and this is second similar surgery.  Will defer to PT  for POC.  Plan was discussed with PT/RN.  Will D/C current OT orders. Thank you.    9/7/2023 by Missy Jackson, OT, OTR/L

## 2023-09-07 NOTE — PROGRESS NOTES
Patient vital signs are at baseline: Yes  Patient able to ambulate as they were prior to admission or with assist devices provided by therapies during their stay:  Yes  Patient MUST void prior to discharge:  Yes  Patient able to tolerate oral intake:  Yes  Pain has adequate pain control using Oral analgesics:  No,  Reason:  utilizing IV dilaudid  Does patient have an identified :  Yes  Has goal D/C date and time been discussed with patient:  Yes  Today, home with . Early discharge anticipated.

## 2023-09-07 NOTE — PROGRESS NOTES
09/07/23 0730   Appointment Info   Signing Clinician's Name / Credentials (PT) Tomas Zuniga, PT, DPT   Living Environment   People in Home spouse   Current Living Arrangements house   Home Accessibility stairs to enter home   Number of Stairs, Main Entrance 6   Self-Care   Usual Activity Tolerance good   Current Activity Tolerance moderate   Equipment Currently Used at Home none   Fall history within last six months yes   Number of times patient has fallen within last six months 2   General Information   Onset of Illness/Injury or Date of Surgery 09/06/23   Referring Physician Dr. Perez   Patient/Family Therapy Goals Statement (PT) Decrease pain with mobility   Pertinent History of Current Problem (include personal factors and/or comorbidities that impact the POC) s/p TKA   Existing Precautions/Restrictions weight bearing   Weight-Bearing Status - LLE full weight-bearing   Weight-Bearing Status - RLE weight-bearing as tolerated   Range of Motion (ROM)   ROM Comment WFL, decreased RLE s/p TKA   Strength (Manual Muscle Testing)   Strength Comments WFL   Transfers   Transfers sit-stand transfer   Sit-Stand Transfer   Sit-Stand [place] (Transfers) contact guard   Assistive Device (Sit-Stand Transfers) walker, front-wheeled   Gait/Stairs (Locomotion)   [place] Level (Gait) contact guard   Assistive Device (Gait) walker, front-wheeled   Distance in Feet 10'   Distance in Feet (Gait) 60' x 2   Pattern (Gait) step-through   Deviations/Abnormal Patterns (Gait) mauricio decreased;stride length decreased   Comment, (Gait/Stairs) Up/down 4 stairs   Clinical Impression   Criteria for Skilled Therapeutic Intervention Yes, treatment indicated   PT Diagnosis (PT) Impaired functional mobility   Influenced by the following impairments Weakness, pain   Functional limitations due to impairments Transfers, gait, stairs   Clinical Presentation (PT Evaluation Complexity) Stable/Uncomplicated   Clinical Presentation  Rationale Pt presents as medically diagnosed   Clinical Decision Making (Complexity) low complexity   Planned Therapy Interventions (PT) gait training;home exercise program;patient/family education;ROM (range of motion);stair training;strengthening;transfer training   Anticipated Equipment Needs at Discharge (PT) walker, rolling   Risk & Benefits of therapy have been explained care plan/treatment goals reviewed;patient   PT Total Evaluation Time   PT Eval, Low Complexity Minutes (33099) 10   Plan of Care Review   Plan of Care Reviewed With patient   Physical Therapy Goals   PT Frequency One time eval and treatment only   PT Predicted Duration/Target Date for Goal Attainment 09/07/23   PT Goals Transfers;Gait;Stairs;PT Goal 1   PT: Transfers Supervision/stand-by assist;Sit to/from stand;Goal Met   PT: Gait Supervision/stand-by assist;Rolling walker;100 feet;Goal Met   PT: Stairs Supervision/stand-by assist;4 stairs;Rail on both sides;Goal Met   PT: Goal 1 I with TKA HEP, Goal Met   Interventions   Interventions Quick Adds Gait Training;Therapeutic Activity;Therapeutic Procedure   Therapeutic Procedure/Exercise   Ther. Procedure: strength, endurance, ROM, flexibillity Minutes (56908) 10   Symptoms Noted During/After Treatment increased pain;fatigue   Treatment Detail/Skilled Intervention Completed TKA HEP with cues for proper carry out/technique.   Therapeutic Activity   Therapeutic Activities: dynamic activities to improve functional performance Minutes (18810) 5   Symptoms Noted During/After Treatment None   Treatment Detail/Skilled Intervention Sit<>stand CGA, educated on role of OT, extending RLE with sitting, safety with activities such as snow shoeing. Increased time for set up.   Gait Training   Gait Training Minutes (09047) 10   Symptoms Noted During/After Treatment (Gait Training) fatigue   Treatment Detail/Skilled Intervention Pt amb well in the halls CGA with FWW, no LOB or adverse s/s, short break to  navigate 4 stairs with non-reciprocal technique and bilat HRs after cues. Educated on walking program upon d/c and increasing activity each day.   [place] Level (Gait Training) contact guard   Physical Assistance Level (Gait Training) verbal cues;supervision   Weight Bearing (Gait Training) weight-bearing as tolerated   Assistive Device (Gait Training) rolling walker   Pattern Analysis (Gait Training) swing-through gait   Gait Analysis Deviations decreased mauricio;decreased step length   Stair Railings present at both sides   Physical Assist/Nonphysical Assist (Stairs) verbal cues;supervision   Level of [place] (Stairs) contact guard   PT Discharge Planning   PT Plan D/C PT   PT Discharge Recommendation (DC Rec)   (Defer to ortho team)   PT Rationale for DC Rec No concerns with d/c,  at home, mobilzing well.   PT Brief overview of current status SBA for mobility, amb 60' x 2 with FWW     Physical Therapy Discharge Summary    Reason for therapy discharge:    Discharged to home with outpatient therapy.  All goals and outcomes met, no further needs identified.    Progress towards therapy goal(s). See goals on Care Plan in [facility] electronic health record for goal details.  Goals met    Therapy recommendation(s):    Continued therapy is recommended.  Rationale/Recommendations:  OP PT.

## 2023-09-07 NOTE — PROGRESS NOTES
Care Management Discharge Note    Discharge Date: 09/07/2023       Discharge Disposition: Home    Discharge Services: None    Discharge DME: None    Discharge Transportation: family or friend will provide    Private pay costs discussed: Not applicable    Education Provided on the Discharge Plan: Yes AVS per bedside nurse     Persons Notified of Discharge Plans: patient per team    Patient/Family in Agreement with the Plan: yes    Additional Information:    Pt discharging to home, outpatient followup, family transport to home.    No CM needs identified.    Brent Tong RN

## 2023-09-07 NOTE — PLAN OF CARE

## 2023-09-07 NOTE — PROGRESS NOTES
[facility] MEDICINE  PROGRESS NOTE     Code Status: Full Code  Procedure(s):  RIGHT TOTAL KNEE ARTHROPLASTY  1 Day Post-Op  Identification/Summary:   Alondra White is a 72 year old female with a PMH of nonischemic cardiomyopathy, HTN, anemia history of transfusions, GERD, incontinence, IFG and depression anxiety.  Underwent right total knee arthroplasty.  Postoperative fingerstick glucose 183.  Recheck the following morning improved to 121.  A1c is pending at this time.     Assessment and Plan:     Status post right total knee arthroplasty  Postoperative management per orthopedics.  Hyperglycemia  History of impaired fasting glucose  The evening of surgery patient had a glucose reading of 183.  This a.m. glucose 121.  Reviewing her chart she has had readings historically 2022 of 132, 104 and 126.  Preoperative glucose was 93.  We will check an A1c.  Patient should follow-up with primary care provider in 1 to 2 weeks.  Nonischemic cardiomyopathy  Essential hypertension  8/7/2023 echocardiogram shows EF 55-60 %.  No other significant abnormalities.  Continue home metoprolol and Entresto with hold parameters.  Continue spironolactone.  Depression anxiety  Continue home antidepressants.  GERD  Continue omeprazole.  Urinary incontinence  Holding her Ditropan and Vesicare at this time.  Monitor voiding.  Can resume if incontinence returns.  Hyperlipidemia  Continue home Lipitor.  Snoring history  In the past it was recommended the patient have a sleep study but she has not yet done so.  Does have some intermittent snoring.  Did not require supplemental oxygen overnight.  Follow-up with primary.     Anticoagulation.  Aspirin 81 mg twice daily ordered by surgery.  Routine postoperative risk.  COVID-19 PCR not tested per current policy  Noted  Fluids: Per orthopedics  Pain meds: Per orthopedics  Therapy: Per orthopedics  Shaw:Not present  Lines: None       Current Diet  Orders Placed  This Encounter      Advance Diet as Tolerated: Regular Diet Adult      Discharge Instruction - Regular Diet Adult    Supplements  None    Barriers to Discharge: Medically stable for discharge by orthopedics.    Disposition: If patient is discharged later today will review her med rec.  Perhaps A1c will be back by that time.    Clinically Significant Risk Factors Present on Admission                     # Chronic heart failure with preserved ejection fraction: heart failure noted on problem list and last echo with EF >50%                Interval History/Subjective:  Patient doing well.  Pain under good control.  No chest pain.  No shortness of breath.  No nausea or vomiting.  Did not require any supplemental oxygen last night.  Postoperative glucose was up to 183 but fingerstick this morning was improved to 121.  Patient notes a history of impaired fasting glucose but has never had to do glucose testing at home or been on any medications.  Questions answered to verbalized satisfaction.      Last 24H PRN:     HYDROmorphone (DILAUDID) injection 0.2 mg, 0.2 mg at 09/07/23 0358 **OR** HYDROmorphone (DILAUDID) injection 0.4 mg, 0.4 mg at 09/06/23 2037    oxyCODONE (ROXICODONE) tablet 5 mg, 5 mg at 09/07/23 0205 **OR** oxyCODONE (ROXICODONE) tablet 10 mg    zolpidem (AMBIEN) tablet 5 mg, 5 mg at 09/06/23 2318    Physical Exam/Objective:  Temp:  [97.2  F (36.2  C)-99.7  F (37.6  C)] 97.2  F (36.2  C)  Pulse:  [61-96] 74  Resp:  [12-40] 16  BP: (108-126)/(51-71) 108/57  SpO2:  [91 %-100 %] 93 %  Wt Readings from Last 4 Encounters:   09/06/23 68.4 kg (150 lb 11.2 oz)   08/31/23 69.6 kg (153 lb 6.4 oz)   05/31/23 69.4 kg (153 lb)   07/20/16 68.5 kg (151 lb)     Body mass index is 24.32 kg/m .    Constitutional: awake, alert, cooperative, no apparent distress, and appears stated age  ENT: Normocephalic, without obvious abnormality, atraumatic, external ears without lesions, oral pharynx with moist mucous membranes, tonsils  without erythema or exudates, gums normal and good dentition.  Respiratory: No increased work of breathing, good air exchange, clear to auscultation bilaterally, no crackles or wheezing  Cardiovascular: Normal apical impulse, regular rate and rhythm, normal S1 and S2, no S3 or S4, and no murmur noted  GI: No scars, normal bowel sounds, soft, non-distended, non-tender, no masses palpated, no hepatosplenomegally  Skin: normal skin color, texture, turgor, no redness, warmth, or swelling, and no rashes  Musculoskeletal: There is no redness, warmth, or swelling of the joints.  Motor strength is 5 out of 5 all extremities bilaterally.  Tone is normal. no lower extremity pitting edema present  Neurologic: Cranial nerves II-XII are grossly intact. Sensory:  Sensory intact  Neuropsychiatric: General: normal, calm, and normal eye contact Level of consciousness: alert / normal Affect: normal Orientation: oriented to self, place, time and situation Memory and insight: normal, memory for past and recent events intact, and thought process normal      Medications:   Personally Reviewed.  Medications    lactated ringers 100 mL/hr at 09/07/23 0423      acetaminophen  975 mg Oral Q8H    aspirin  81 mg Oral BID    atorvastatin  10 mg Oral QPM    calcium carbonate-vitamin D  1 tablet Oral BID    DULoxetine  30 mg Oral QPM    DULoxetine  60 mg Oral QAM    metoprolol succinate ER  50 mg Oral QPM    [Held by provider] oxyBUTYnin ER  5 mg Oral Daily    pantoprazole  40 mg Oral Daily    polyethylene glycol  17 g Oral Daily    psyllium  1 packet Oral Daily    sacubitril-valsartan  1 tablet Oral BID    senna-docusate  1 tablet Oral BID    sodium chloride (PF)  3 mL Intracatheter Q8H    [Held by provider] solifenacin  5 mg Oral Daily at 4 pm    spironolactone  12.5 mg Oral Daily       Data reviewed today: I personally reviewed all new medications, labs, imaging/diagnostics reports over the past 24 hours. Pertinent findings include:    Imaging:  "  Recent Results (from the past 24 hour(s))   POC US Guidance Needle Placement    Narrative    Ultrasound was performed as guidance to an anesthesia procedure.  Click   \"PACS images\" hyperlink below to view any stored images.  For specific   procedure details, view procedure note authored by anesthesia.       Labs:  POC US Guidance Needle Placement   Final Result        Recent Results (from the past 24 hour(s))   Glucose by meter    Collection Time: 09/06/23  9:56 PM   Result Value Ref Range    GLUCOSE BY METER POCT 183 (H) 70 - 99 mg/dL   Glucose by meter    Collection Time: 09/07/23  6:52 AM   Result Value Ref Range    GLUCOSE BY METER POCT 121 (H) 70 - 99 mg/dL       Pending Labs:  Unresulted Labs Ordered in the Past 30 Days of this Admission       No orders found for last 31 day(s).              Ac Shook MD  [facility]  Phone: #[phone]    "

## 2023-09-07 NOTE — PROGRESS NOTES
Care Management Follow Up    Length of Stay (days): 0    Expected Discharge Date: 09/07/2023     Concerns to be Addressed: no discharge needs identified     Patient plan of care discussed at interdisciplinary rounds: Yes    Anticipated Discharge Disposition: Home     Anticipated Discharge Services: None  Anticipated Discharge DME: None    Patient/family educated on Medicare website which has current facility and service quality ratings:    Education Provided on the Discharge Plan: Yes  Patient/Family in Agreement with the Plan: yes    Referrals Placed by CM/SW:  NA  Private pay costs discussed: Not applicable    Additional Information:    Chart reviewed.    Pt appears to be from home,  [place].  Anticipate discharge home, family transport.    CM will continue to be available.    Brent Tong RN

## 2023-09-07 NOTE — ANESTHESIA POSTPROCEDURE EVALUATION
Patient: Alondra White    Procedure: Procedure(s):  RIGHT TOTAL KNEE ARTHROPLASTY       Anesthesia Type:  Spinal    Note:     Postop Pain Control: Uneventful            Sign Out: Well controlled pain   PONV: No   Neuro/Psych: Uneventful            Sign Out: Acceptable/Baseline neuro status   Airway/Respiratory: Uneventful            Sign Out: Acceptable/Baseline resp. status   CV/Hemodynamics: Uneventful            Sign Out: Acceptable CV status; No obvious hypovolemia; No obvious fluid overload   Other NRE: NONE   DID A NON-ROUTINE EVENT OCCUR? No           Last vitals:  Vitals Value Taken Time   /71 09/06/23 1610   Temp 37.6  C (99.7  F) 09/06/23 1530   Pulse 81 09/06/23 1613   Resp 16 09/06/23 1610   SpO2 98 % 09/06/23 1614   Vitals shown include unvalidated device data.    Electronically Signed By: Joaquin Vargas MD  September 7, 2023  1:11 AM

## 2023-09-07 NOTE — PROGRESS NOTES
Patient discharged home with  family to transport to home. VSS on RA. PIV access removed prior to discharge. Belongings remain with pt at discharge. AVS reviewed with pt, questions answered, education complete.

## 2023-09-07 NOTE — DISCHARGE SUMMARY
[facility] Orthopedics Discharge Summary                                  [facility]     LUIS MIGUEL WALSH [id number]   Age: 72 year old  PCP: Jasmeet Chaves, [phone] 1950     Date of Admission:  9/6/2023  Date of Discharge::  9/7/2023  Discharge Provider:  KATLYN Monsalve CNP    Code status:  Full Code    Admission Information:  Admission Diagnosis:  Osteoarthritis of knee [M17.9]  OA (osteoarthritis) [M19.90]    Post-Operative Day: 1 Day Post-Op     Reason for admission:  The patient was admitted for the following:Procedure(s) (LRB):  RIGHT TOTAL KNEE ARTHROPLASTY (Right)    Principal Problem:    OA (osteoarthritis)  Active Problems:    Anxiety    Nonischemic cardiomyopathy (H)    Urinary frequency    Impaired fasting glucose      Allergies:  Paxil [paroxetine] and Zoloft [sertraline]    Following the procedure noted above the patient was transferred to the post-op floor and started on:    Therapy:  physical therapy  Anticoagulation Plan: Aspirin 81 mg BID  for 42 days  Pain Management: scopainmedication: oxycodone and tylenol  Weight bearing status: Weight bearing as tolerated     The patient was followed by Orthopedics during the inpatient treatment course:  Complications:  None  Additional consultations:  [facility], expertise and consultation appreciated.        Discharge Information:  Condition at discharge: Stable  Discharge destination:  Discharged to home     Medications at discharge:  Current Discharge Medication List        START taking these medications    Details   aspirin 81 MG EC tablet Take 1 tablet (81 mg) by mouth 2 times daily for 42 days Take 1 aspirin twice a day for 42 days to prevent blood clots. Take with food. Do not take at same time as other anti-inflammatories, like ibuprofen or celebrex.  Qty: 84 tablet, Refills: 0    Associated Diagnoses: Primary osteoarthritis of right knee      oxyCODONE (ROXICODONE) 5 MG tablet Take 1-2 tablets (5-10 mg) by mouth every 4  hours as needed for moderate to severe pain Take 1 pill for moderate pain (4-6/10) and 2 pills for severe pain (7-10/10). Maximum 6 pills per day. Alternate with Tylenol.  Qty: 33 tablet, Refills: 0    Associated Diagnoses: Primary osteoarthritis of right knee           CONTINUE these medications which have NOT CHANGED    Details   acetaminophen (TYLENOL) 500 MG tablet Take 500 mg by mouth every 6 hours as needed      atorvastatin (LIPITOR) 10 MG tablet Take 10 mg by mouth daily      calcium carbonate-vitamin D (CALTRATE) 600-10 MG-MCG per tablet Take 1 tablet by mouth 2 times daily      clobetasol (TEMOVATE) 0.05 % external ointment Apply topically 2 times daily as needed      clonazePAM (KLONOPIN) 0.5 MG tablet Take 0.5 mg by mouth nightly as needed      diclofenac (VOLTAREN) 1 % topical gel Apply 2 g topically 4 times daily as needed      !! DULoxetine (CYMBALTA) 30 MG capsule Take 30 mg by mouth every evening      !! DULoxetine (CYMBALTA) 60 MG capsule Take 60 mg by mouth every morning      metoprolol succinate ER (TOPROL XL) 50 MG 24 hr tablet Take 1 tablet (50 mg) by mouth daily  Qty: 90 tablet, Refills: 1    Comments: Reduced 7/10/23. EITAN SANTIZO  Associated Diagnoses: Nonischemic cardiomyopathy (H); LBBB (left bundle branch block)      omeprazole (PRILOSEC) 20 MG capsule [OMEPRAZOLE (PRILOSEC) 20 MG CAPSULE] Take 20 mg by mouth daily.      oxyBUTYnin ER (DITROPAN XL) 5 MG 24 hr tablet Take 5 mg by mouth daily      polyethylene glycol (MIRALAX) 17 g packet Take 1 packet by mouth daily      sacubitril-valsartan (ENTRESTO) 49-51 MG per tablet Take 1 tablet by mouth 2 times daily  Qty: 180 tablet, Refills: 3    Associated Diagnoses: Nonischemic cardiomyopathy (H); LBBB (left bundle branch block); Chronic systolic congestive heart failure (H)      solifenacin (VESICARE) 5 MG tablet Take 5 mg by mouth daily at 2 pm      spironolactone (ALDACTONE) 25 MG tablet Take 12.5 mg by mouth daily      zolpidem (AMBIEN) 5 MG  tablet Take 5 mg by mouth nightly as needed       !! - Potential duplicate medications found. Please discuss with provider.        STOP taking these medications       psyllium (METAMUCIL) 3.4 gram packet Comments:   Reason for Stopping:                          Follow-Up Care:  Patient should be seen in the office in 10-14 days by the Orthopedic Surgeon/Physician Assistant.  Call [phone] for appointment or questions.    It was my pleasure to take care of the above patient.  KATLYN Monsalve CNP

## 2023-09-07 NOTE — PROGRESS NOTES
Patient vital signs are at baseline: Yes  Patient able to ambulate as they were prior to admission or with assist devices provided by therapies during their stay:  Yes  Walked >75 feet  Patient MUST void prior to discharge:  Yes  voided  Patient able to tolerate oral intake:  Yes  Pain has adequate pain control using Oral analgesics:  No,  Reason:  needed a dose of IV dilaudid tonight  Does patient have an identified :  Yes  Has goal D/C date and time been discussed with patient:  Yes  09/07/23 early discharge antiipated

## 2024-03-04 ENCOUNTER — TRANSFERRED RECORDS (OUTPATIENT)
Dept: HEALTH INFORMATION MANAGEMENT | Facility: CLINIC | Age: 74
End: 2024-03-04

## 2024-04-22 ENCOUNTER — TRANSFERRED RECORDS (OUTPATIENT)
Dept: HEALTH INFORMATION MANAGEMENT | Facility: CLINIC | Age: 74
End: 2024-04-22
Payer: MEDICARE

## 2024-04-22 LAB
ALT SERPL-CCNC: 20 U/L
AST SERPL-CCNC: 23 U/L (ref 14–36)
CHOLESTEROL (EXTERNAL): 169 MG/DL (ref 0–200)
CREATININE (EXTERNAL): 0.6 MG/DL (ref 0.7–1.4)
GFR ESTIMATED (EXTERNAL): 94 ML/MIN/1.73M2
GLUCOSE (EXTERNAL): 108 MG/DL (ref 60–99)
HBA1C MFR BLD: 6.22 % (ref 4–5.6)
HDLC SERPL-MCNC: 76 MG/DL (ref 35–65)
LDL CHOLESTEROL CALCULATED (EXTERNAL): 71 MG/DL (ref 0–130)
POTASSIUM (EXTERNAL): 4.8 MMOL/L (ref 3.5–5.1)
TRIGLYCERIDES (EXTERNAL): 111 MG/DL (ref 0–200)

## 2024-05-06 ENCOUNTER — OFFICE VISIT (OUTPATIENT)
Dept: CARDIOLOGY | Facility: CLINIC | Age: 74
End: 2024-05-06
Payer: MEDICARE

## 2024-05-06 VITALS
DIASTOLIC BLOOD PRESSURE: 61 MMHG | SYSTOLIC BLOOD PRESSURE: 98 MMHG | RESPIRATION RATE: 16 BRPM | HEART RATE: 72 BPM | WEIGHT: 149 LBS | OXYGEN SATURATION: 97 % | BODY MASS INDEX: 24.05 KG/M2

## 2024-05-06 DIAGNOSIS — I51.7 LVH (LEFT VENTRICULAR HYPERTROPHY): ICD-10-CM

## 2024-05-06 DIAGNOSIS — I50.22 CHRONIC SYSTOLIC CONGESTIVE HEART FAILURE (H): Primary | ICD-10-CM

## 2024-05-06 DIAGNOSIS — I42.8 NONISCHEMIC CARDIOMYOPATHY (H): ICD-10-CM

## 2024-05-06 DIAGNOSIS — I44.7 LBBB (LEFT BUNDLE BRANCH BLOCK): ICD-10-CM

## 2024-05-06 PROCEDURE — 99214 OFFICE O/P EST MOD 30 MIN: CPT | Performed by: INTERNAL MEDICINE

## 2024-05-06 RX ORDER — ESTRADIOL 0.1 MG/G
CREAM VAGINAL DAILY
COMMUNITY
Start: 2024-03-11

## 2024-05-06 RX ORDER — BETAMETHASONE DIPROPIONATE 0.5 MG/G
OINTMENT, AUGMENTED TOPICAL
COMMUNITY
Start: 2024-03-23

## 2024-05-06 RX ORDER — SACUBITRIL AND VALSARTAN 24; 26 MG/1; MG/1
1 TABLET, FILM COATED ORAL 2 TIMES DAILY
Qty: 180 TABLET | Refills: 3 | Status: SHIPPED | OUTPATIENT
Start: 2024-05-06 | End: 2024-08-28

## 2024-05-06 RX ORDER — LIDOCAINE HYDROCHLORIDE 30 MG/G
CREAM TOPICAL
COMMUNITY
Start: 2024-03-07

## 2024-05-06 NOTE — PROGRESS NOTES
HEART CARE ENCOUNTER CONSULTATON NOTE      [facility]  [phone]      Assessment/Recommendations   Assessment:   1.  History of nonischemic cardiomyopathy, heart failure with improved ejection fraction (HFimEF). LVEF:60% (resting), NYHA II.  Nonischemic cardiomyopathy likely LV dyssynchrony from left bundle branch block.  2.  Exertional dyspnea (mild chronic and stable).  3.  Chronic left bundle branch block, QRS: 138 ms.   4.  Mild left ventricular hypertrophy    Plan:   1. Metoprolol XL 50 mg daily  2.  Remains off lasix.   3.  Continue Sacubitril-valsartan (ENTRESTO), at lower dose of 24/26 mg BID  4.  Discontinue Aldactone 12.5 mg daily         History of Present Illness/Subjective    HPI: Alondra White is a 72 year old female history of nonischemic cardiomyopathy, left bundle branch block, congestive heart failure who presents to [facility] cardiology office for management of nonischemic cardiomyopathy.    Since last clinical evaluation she has recovered her left ventricular ejection fraction.  She will discontinue Aldactone.  Currently denies any lower extremity edema orthopnea or PND symptoms.  Denies any fatigue.  Occasional lightheadedness with standing.    Tolerated orthopedic surgery well.      Echo: 2023, reviewed.    1. Left ventricular size, wall thickness and systolic function are normal. The  estimated left ventricular ejection fraction is 55-60%.  2. Right ventricular size and systolic function are normal.  3. No hemodynamically significant valvular abnormalities.  4. No prior study available for comparison.    Echocardiogram Results: 2022  Summary     1. The estimated ejection fraction is 50%.  The left ventricular size is   normal.  Systolic function is normal.  Wall thickness is mildly increased.   Left ventricular segmental wall motion is grossly normal. Abnormal septal   wall motion related to LBBB.     2. Grade 1 diastolic filling pattern (impaired relaxation  with low to   normal   filling pressure).     3. Grossly normal RV size and function.     4. Normal biatrial size.     5. Lipomatous hypertrophy of the interatrial septum.     6. The mitral valve has thickened leaflets.     7. There is mild mitral valve regurgitation.     8. Compared to prior TTE study, the LVEF is unchanged.      Coronary Angiogram: 7/30/2018   Conclusions        1.  Normal coronary arteries. Left dominant.        2.  Normal filling pressures        RA mean 5mmHg        RV 26/6mmHg        PA 25/6, mean 14mmHg        PCWP mean 12mmHg        LVEDP 12mmHg      Cardiac MRI: 2018 (3scale)  Conclusions:  Severe LV systolic dysfunction, EF 27 %. The ventricle is eccentrically   remodeled and wall motion is  dyssynergic.      Areas of delayed enhancement in the septal mid myocardium and at the   septal/RV insertion point. These      are in a nonischemic pattern but are not pathognomonic for any specific   etiology.    No significant valve lesions.    Normal RV systolic function.        Physical Examination  Review of Systems   Vitals: BP 98/61 (BP Location: Left arm, Patient Position: Sitting, Cuff Size: Adult Regular)   Pulse 72   Resp 16   Wt 67.6 kg (149 lb)   SpO2 97%   BMI 24.05 kg/m    BMI= Body mass index is 24.05 kg/m .  Wt Readings from Last 3 Encounters:   05/06/24 67.6 kg (149 lb)   09/06/23 68.4 kg (150 lb 11.2 oz)   08/31/23 69.6 kg (153 lb 6.4 oz)        Pleasant   ENT/Mouth: membranes moist, no oral lesions or bleeding gums.      EYES:  no scleral icterus, normal conjunctivae       Chest/Lungs:   lungs are clear to auscultation, no rales or wheezing, no sternal scar, equal chest wall expansion    Cardiovascular:   Regular. Normal first and second heart sounds with no murmurs, rubs, or gallops; the carotid, radial and posterior tibial pulses are intact, Jugular venous pressure normal, no edema bilaterally. No change.    Abdomen:  no tenderness; bowel sounds are present    Extremities: no cyanosis or clubbing   Skin: no xanthelasma, warm.    Neurologic: normal  bilateral, no tremors     Psychiatric: alert and oriented x3, calm        Please refer above for cardiac ROS details.        Medical History  Surgical History Family History Social History   Past Medical History:   Diagnosis Date    Anemia     Arthritis     Congestive heart failure (H)     Gastroesophageal reflux disease     History of blood transfusion     Hypertension     LBBB (left bundle branch block)     Non-ischemic cardiomyopathy (H)      Past Surgical History:   Procedure Laterality Date    ARTHROPLASTY KNEE Right 9/6/2023    Procedure: RIGHT TOTAL KNEE ARTHROPLASTY;  Surgeon: Andrew Perez MD;  Location: [facility] Main OR    ARTHROPLASTY KNEE UNICOMPARTMENT Left 07/20/2016    Procedure: LEFT KNEE MEDIAL UNICOMPARTMENTAL ARTHROPLASTY;  Surgeon: Marko Norris MD;  Location: [facility];  Service:     ARTHROSCOPY KNEE      CV CORONARY ANGIOGRAM  2018    Normal Coronary Arteries.    HYSTERECTOMY      RELEASE CARPAL TUNNEL      TONSILLECTOMY       Family History   Problem Relation Age of Onset    Coronary Artery Disease Mother 71        MI    Chronic Obstructive Pulmonary Disease Father 80    Coronary Artery Disease Father 90        MI    Diabetes Type 2  Sister         Social History     Socioeconomic History    Marital status:      Spouse name: Not on file    Number of children: Not on file    Years of education: Not on file    Highest education level: Not on file   Occupational History    Not on file   Tobacco Use    Smoking status: Never    Smokeless tobacco: Never   Vaping Use    Vaping status: Never Used   Substance and Sexual Activity    Alcohol use: Yes     Comment: Alcoholic Drinks/day: one per day    Drug use: No    Sexual activity: Not on file   Other Topics Concern    Not on file   Social History Narrative    Not on file     Social Determinants of Health     Financial Resource  Strain: Not on file   Food Insecurity: Not on file   Transportation Needs: Not on file   Physical Activity: Not on file   Stress: Not on file   Social Connections: Not on file   Interpersonal Safety: Not on file   Housing Stability: Not on file           Medications  Allergies   Current Outpatient Medications   Medication Sig Dispense Refill    atorvastatin (LIPITOR) 10 MG tablet Take 10 mg by mouth daily      augmented betamethasone dipropionate (DIPROLENE-AF) 0.05 % external ointment APPLY 1 THIN LAYER TOPICALLY TO THE AFFECTED AREA EVERY MORNING FOR 2 TO 4 WEEKS. RETURN TO CLINIC FOR EVALUATION AFTER USE      calcium carbonate-vitamin D (CALTRATE) 600-10 MG-MCG per tablet Take 1 tablet by mouth 2 times daily      clobetasol (TEMOVATE) 0.05 % external ointment Apply topically 2 times daily as needed      clonazePAM (KLONOPIN) 0.5 MG tablet Take 0.5 mg by mouth nightly as needed      diclofenac (VOLTAREN) 1 % topical gel Apply 2 g topically 4 times daily as needed      DULoxetine (CYMBALTA) 30 MG capsule Take 30 mg by mouth every evening      DULoxetine (CYMBALTA) 60 MG capsule Take 60 mg by mouth every morning      estradiol (ESTRACE) 0.1 MG/GM vaginal cream Place vaginally daily      lidocaine HCl 3 % cream APPLY TOPICALLY TO THE AFFECTED AREA THREE TIMES DAILY APPLY TO THE AFFECTED AREA AS NEEDED      metoprolol succinate ER (TOPROL XL) 50 MG 24 hr tablet Take 1 tablet (50 mg) by mouth daily 90 tablet 1    omeprazole (PRILOSEC) 20 MG capsule [OMEPRAZOLE (PRILOSEC) 20 MG CAPSULE] Take 20 mg by mouth daily.      oxyBUTYnin ER (DITROPAN XL) 5 MG 24 hr tablet Take 5 mg by mouth daily      polyethylene glycol (MIRALAX) 17 g packet Take 1 packet by mouth daily as needed      sacubitril-valsartan (ENTRESTO) 49-51 MG per tablet Take 1 tablet by mouth 2 times daily 180 tablet 3    spironolactone (ALDACTONE) 25 MG tablet Take 12.5 mg by mouth daily      zolpidem (AMBIEN) 5 MG tablet Take 5 mg by mouth nightly as needed       acetaminophen (TYLENOL) 500 MG tablet Take 500 mg by mouth every 6 hours as needed      oxyCODONE (ROXICODONE) 5 MG tablet Take 1-2 tablets (5-10 mg) by mouth every 4 hours as needed for moderate to severe pain Take 1 pill for moderate pain (4-6/10) and 2 pills for severe pain (7-10/10). Maximum 6 pills per day. Alternate with Tylenol. 33 tablet 0    solifenacin (VESICARE) 5 MG tablet Take 5 mg by mouth daily at 2 pm (Patient not taking: Reported on 5/6/2024)         Allergies   Allergen Reactions    Paxil [Paroxetine] Nausea    Zoloft [Sertraline] Nausea          Lab Results    Chemistry/lipid CBC Cardiac Enzymes/BNP/TSH/INR   No results for input(s): CHOL, HDL, LDL, TRIG, CHOLHDLRATIO in the last 60010 hours.  No results for input(s): LDL in the last 25544 hours.  No results for input(s): NA, POTASSIUM, CHLORIDE, CO2, GLC, BUN, CR, GFRESTIMATED, ESTHELA in the last 81734 hours.    Invalid input(s): GRFESTBLACK  No results for input(s): CR in the last 17553 hours.  No results for input(s): A1C in the last 34061 hours.       No results for input(s): WBC, HGB, HCT, MCV, PLT in the last 55102 hours.  No results for input(s): HGB in the last 77177 hours. No results for input(s): TROPONINI in the last 14683 hours.  No results for input(s): BNP, NTBNPI, NTBNP in the last 22119 hours.  No results for input(s): TSH in the last 07051 hours.  No results for input(s): INR in the last 67216 hours.     Jasmeet Chaves DO

## 2024-06-20 ENCOUNTER — MYC REFILL (OUTPATIENT)
Dept: CARDIOLOGY | Facility: CLINIC | Age: 74
End: 2024-06-20
Payer: MEDICARE

## 2024-06-20 DIAGNOSIS — I44.7 LBBB (LEFT BUNDLE BRANCH BLOCK): ICD-10-CM

## 2024-06-20 DIAGNOSIS — I42.8 NONISCHEMIC CARDIOMYOPATHY (H): ICD-10-CM

## 2024-06-20 RX ORDER — METOPROLOL SUCCINATE 50 MG/1
50 TABLET, EXTENDED RELEASE ORAL DAILY
Qty: 90 TABLET | Refills: 1 | Status: SHIPPED | OUTPATIENT
Start: 2024-06-20 | End: 2024-06-20

## 2024-07-20 ENCOUNTER — HEALTH MAINTENANCE LETTER (OUTPATIENT)
Age: 74
End: 2024-07-20

## 2024-08-27 ENCOUNTER — MYC MEDICAL ADVICE (OUTPATIENT)
Dept: CARDIOLOGY | Facility: CLINIC | Age: 74
End: 2024-08-27
Payer: MEDICARE

## 2024-08-27 DIAGNOSIS — I42.8 NONISCHEMIC CARDIOMYOPATHY (H): ICD-10-CM

## 2024-08-27 DIAGNOSIS — I44.7 LBBB (LEFT BUNDLE BRANCH BLOCK): ICD-10-CM

## 2024-08-27 DIAGNOSIS — I51.7 LVH (LEFT VENTRICULAR HYPERTROPHY): ICD-10-CM

## 2024-08-27 DIAGNOSIS — I50.22 CHRONIC SYSTOLIC CONGESTIVE HEART FAILURE (H): ICD-10-CM

## 2024-08-28 RX ORDER — SACUBITRIL AND VALSARTAN 24; 26 MG/1; MG/1
1 TABLET, FILM COATED ORAL 2 TIMES DAILY
Qty: 180 TABLET | Refills: 3 | Status: SHIPPED | OUTPATIENT
Start: 2024-08-28 | End: 2024-08-30

## 2024-08-30 RX ORDER — SACUBITRIL AND VALSARTAN 24; 26 MG/1; MG/1
1 TABLET, FILM COATED ORAL 2 TIMES DAILY
Qty: 180 TABLET | Refills: 3 | Status: SHIPPED | OUTPATIENT
Start: 2024-08-30

## 2024-08-30 RX ORDER — SACUBITRIL AND VALSARTAN 24; 26 MG/1; MG/1
1 TABLET, FILM COATED ORAL 2 TIMES DAILY
Qty: 180 TABLET | Refills: 3 | Status: SHIPPED | OUTPATIENT
Start: 2024-08-30 | End: 2024-08-30

## 2024-12-10 ENCOUNTER — OFFICE VISIT (OUTPATIENT)
Dept: CARDIOLOGY | Facility: CLINIC | Age: 74
End: 2024-12-10
Attending: INTERNAL MEDICINE
Payer: MEDICARE

## 2024-12-10 VITALS
SYSTOLIC BLOOD PRESSURE: 122 MMHG | HEIGHT: 66 IN | DIASTOLIC BLOOD PRESSURE: 64 MMHG | RESPIRATION RATE: 8 BRPM | HEART RATE: 60 BPM | BODY MASS INDEX: 24.8 KG/M2 | WEIGHT: 154.3 LBS

## 2024-12-10 DIAGNOSIS — I50.22 CHRONIC SYSTOLIC CONGESTIVE HEART FAILURE (H): Primary | ICD-10-CM

## 2024-12-10 DIAGNOSIS — I44.7 LBBB (LEFT BUNDLE BRANCH BLOCK): ICD-10-CM

## 2024-12-10 DIAGNOSIS — I42.8 NONISCHEMIC CARDIOMYOPATHY (H): ICD-10-CM

## 2024-12-10 DIAGNOSIS — I51.7 LVH (LEFT VENTRICULAR HYPERTROPHY): ICD-10-CM

## 2024-12-10 PROCEDURE — 99214 OFFICE O/P EST MOD 30 MIN: CPT | Performed by: INTERNAL MEDICINE

## 2024-12-10 RX ORDER — DESONIDE 0.5 MG/G
OINTMENT TOPICAL PRN
COMMUNITY
Start: 2024-11-07

## 2024-12-10 RX ORDER — KETOCONAZOLE 20 MG/ML
SHAMPOO, SUSPENSION TOPICAL DAILY PRN
COMMUNITY
Start: 2024-11-07

## 2024-12-10 RX ORDER — OXYBUTYNIN CHLORIDE 10 MG/1
1 TABLET, EXTENDED RELEASE ORAL
COMMUNITY
Start: 2024-11-19

## 2024-12-10 RX ORDER — TRAZODONE HYDROCHLORIDE 50 MG/1
50 TABLET, FILM COATED ORAL
COMMUNITY
Start: 2024-11-22

## 2024-12-10 NOTE — PROGRESS NOTES
HEART CARE ENCOUNTER CONSULTATON NOTE      [facility]  [phone]      Assessment/Recommendations   Assessment:   1.  History of nonischemic cardiomyopathy, heart failure with improved ejection fraction (HFimEF). LVEF:60% (resting), NYHA I.  Nonischemic cardiomyopathy likely LV dyssynchrony from left bundle branch block.  LVEF: 25% in 2018.    2.  Exertional dyspnea (mild chronic and stable).  3.  Chronic left bundle branch block, QRS: 138 ms.   4.  Mild left ventricular hypertrophy    Plan:   1.  Metoprolol XL 50 mg daily, continue   2.  Continue Sacubitril-valsartan (ENTRESTO), at lower dose of 24/26 mg BID.  Blood pressure concerns on higher dose.     Echo reviewed.   Follow-up in 1 year.         History of Present Illness/Subjective    HPI: Alondra White is a 74 year old female history of nonischemic cardiomyopathy, left bundle branch block, congestive heart failure who presents to cardiology clinic for management of nonischemic cardiomyopathy.    Cardiovascular standpoint patient is doing well.  She denies any dyspnea exertion lower extreme edema orthopnea or PND symptoms.  Recent echocardiogram demonstrated stable left ventricular function with ejection fraction 50 to 55%.  This was unchanged compared to prior.  Blood pressure remarkably improved at 120/64.  Previous blood pressures were hypotensive.  She denies any palpitations or chest pain.  Compliant with all medications.  Reviewed in detail including echocardiogram    Echo: 2024, reviewed from outside source  Summary    1. Normal LV size with mildly increased wall thickness. Calculated 3D LVEF 52%. No regional wall motion abnormalities. Low-normal systolic function. Grade I diastolic dysfunction.     2. Basal septal hypertrophy is present.     3. Normal RV size and systolic function.     4. Thickened mitral valve leaflets.     5. Mild mitral valve regurgitation.     6. The estimated pulmonary artery systolic pressure is 28 mmHg.  "    7. Sinus rhythm during study.     8. Compared to the prior study from 4/7/22, the current study reveals no significant changes. LVEF was 50% on the prior study.       Echocardiogram Results: 2022  Summary     1. The estimated ejection fraction is 50%.  The left ventricular size is   normal.  Systolic function is normal.  Wall thickness is mildly increased.   Left ventricular segmental wall motion is grossly normal. Abnormal septal   wall motion related to LBBB.     2. Grade 1 diastolic filling pattern (impaired relaxation with low to   normal   filling pressure).     3. Grossly normal RV size and function.     4. Normal biatrial size.     5. Lipomatous hypertrophy of the interatrial septum.     6. The mitral valve has thickened leaflets.     7. There is mild mitral valve regurgitation.     8. Compared to prior TTE study, the LVEF is unchanged.      Coronary Angiogram: 7/30/2018   Conclusions        1.  Normal coronary arteries. Left dominant.        2.  Normal filling pressures        RA mean 5mmHg        RV 26/6mmHg        PA 25/6, mean 14mmHg        PCWP mean 12mmHg        LVEDP 12mmHg      Cardiac MRI: 2018 ([facility])  Conclusions:  Severe LV systolic dysfunction, EF 27 %. The ventricle is eccentrically   remodeled and wall motion is  dyssynergic.      Areas of delayed enhancement in the septal mid myocardium and at the   septal/RV insertion point. These      are in a nonischemic pattern but are not pathognomonic for any specific   etiology.    No significant valve lesions.    Normal RV systolic function.        Physical Examination  Review of Systems   Vitals: /64 (BP Location: Right arm, Patient Position: Sitting, Cuff Size: Adult Regular)   Pulse 60   Resp (!) 8   Ht 1.676 m (5' 6\")   Wt 70 kg (154 lb 4.8 oz)   BMI 24.90 kg/m    BMI= Body mass index is 24.9 kg/m .  Wt Readings from Last 3 Encounters:   05/06/24 67.6 kg (149 lb)   09/06/23 68.4 kg (150 lb 11.2 oz)   08/31/23 69.6 kg (153 " lb 6.4 oz)        Pleasant   ENT/Mouth: membranes moist, no oral lesions or bleeding gums.      EYES:  no scleral icterus, normal conjunctivae       Chest/Lungs:   lungs are clear to auscultation, no rales or wheezing, no sternal scar, equal chest wall expansion    Cardiovascular:   Regular. Normal first and second heart sounds with no murmurs, rubs, or gallops; the carotid, radial and posterior tibial pulses are intact, Jugular venous pressure normal, no edema bilaterally. No change.    Abdomen:  no tenderness; bowel sounds are present   Extremities: no cyanosis or clubbing   Skin: no xanthelasma, warm.    Neurologic: normal  bilateral, no tremors     Psychiatric: alert and oriented x3, calm        Please refer above for cardiac ROS details.        Medical History  Surgical History Family History Social History   Past Medical History:   Diagnosis Date    Anemia     Arthritis     Congestive heart failure (H)     Gastroesophageal reflux disease     History of blood transfusion     Hypertension     LBBB (left bundle branch block)     Non-ischemic cardiomyopathy (H)      Past Surgical History:   Procedure Laterality Date    ARTHROPLASTY KNEE Right 9/6/2023    Procedure: RIGHT TOTAL KNEE ARTHROPLASTY;  Surgeon: Andrew Perez MD;  Location: [facility] OR    ARTHROPLASTY KNEE UNICOMPARTMENT Left 07/20/2016    Procedure: LEFT KNEE MEDIAL UNICOMPARTMENTAL ARTHROPLASTY;  Surgeon: Marko Norris MD;  Location: [facility];  Service:     ARTHROSCOPY KNEE      CV CORONARY ANGIOGRAM  2018    Normal Coronary Arteries.    HYSTERECTOMY      RELEASE CARPAL TUNNEL      TONSILLECTOMY       Family History   Problem Relation Age of Onset    Coronary Artery Disease Mother 71        MI    Chronic Obstructive Pulmonary Disease Father 80    Coronary Artery Disease Father 90        MI    Diabetes Type 2  Sister         Social History     Socioeconomic History    Marital status:      Spouse name: Not on file     Number of children: Not on file    Years of education: Not on file    Highest education level: Not on file   Occupational History    Not on file   Tobacco Use    Smoking status: Never    Smokeless tobacco: Never   Vaping Use    Vaping status: Never Used   Substance and Sexual Activity    Alcohol use: Yes     Comment: Alcoholic Drinks/day: one per day    Drug use: No    Sexual activity: Not on file   Other Topics Concern    Not on file   Social History Narrative    Not on file     Social Drivers of Health     Financial Resource Strain: Not on file   Food Insecurity: Not on file   Transportation Needs: Not on file   Physical Activity: Not on file   Stress: Not on file   Social Connections: Not on file   Interpersonal Safety: Not on file   Housing Stability: Not on file           Medications  Allergies   Current Outpatient Medications   Medication Sig Dispense Refill    atorvastatin (LIPITOR) 10 MG tablet Take 10 mg by mouth daily      augmented betamethasone dipropionate (DIPROLENE-AF) 0.05 % external ointment APPLY 1 THIN LAYER TOPICALLY TO THE AFFECTED AREA EVERY MORNING FOR 2 TO 4 WEEKS. RETURN TO CLINIC FOR EVALUATION AFTER USE      calcium carbonate-vitamin D (CALTRATE) 600-10 MG-MCG per tablet Take 1 tablet by mouth 2 times daily      clobetasol (TEMOVATE) 0.05 % external ointment Apply topically 2 times daily as needed      clonazePAM (KLONOPIN) 0.5 MG tablet Take 0.5 mg by mouth nightly as needed      diclofenac (VOLTAREN) 1 % topical gel Apply 2 g topically 4 times daily as needed      DULoxetine (CYMBALTA) 30 MG capsule Take 30 mg by mouth every evening      DULoxetine (CYMBALTA) 60 MG capsule Take 60 mg by mouth every morning      estradiol (ESTRACE) 0.1 MG/GM vaginal cream Place vaginally daily      lidocaine HCl 3 % cream APPLY TOPICALLY TO THE AFFECTED AREA THREE TIMES DAILY APPLY TO THE AFFECTED AREA AS NEEDED      metoprolol succinate ER (TOPROL XL) 50 MG 24 hr tablet Take 1 tablet (50 mg) by mouth  daily 90 tablet 1    omeprazole (PRILOSEC) 20 MG capsule [OMEPRAZOLE (PRILOSEC) 20 MG CAPSULE] Take 20 mg by mouth daily.      oxyBUTYnin ER (DITROPAN XL) 5 MG 24 hr tablet Take 5 mg by mouth daily      polyethylene glycol (MIRALAX) 17 g packet Take 1 packet by mouth daily as needed      sacubitril-valsartan (ENTRESTO) 24-26 MG per tablet Take 1 tablet by mouth 2 times daily. 180 tablet 3    zolpidem (AMBIEN) 5 MG tablet Take 5 mg by mouth nightly as needed         Allergies   Allergen Reactions    Paxil [Paroxetine] Nausea    Zoloft [Sertraline] Nausea          Lab Results    Chemistry/lipid CBC Cardiac Enzymes/BNP/TSH/INR   No results for input(s): CHOL, HDL, LDL, TRIG, CHOLHDLRATIO in the last 84567 hours.  No results for input(s): LDL in the last 06636 hours.  No results for input(s): NA, POTASSIUM, CHLORIDE, CO2, GLC, BUN, CR, GFRESTIMATED, ESTHELA in the last 29976 hours.    Invalid input(s): GRFESTBLACK  No results for input(s): CR in the last 23719 hours.  No results for input(s): A1C in the last 31870 hours.       No results for input(s): WBC, HGB, HCT, MCV, PLT in the last 66725 hours.  No results for input(s): HGB in the last 70559 hours. No results for input(s): TROPONINI in the last 88374 hours.  No results for input(s): BNP, NTBNPI, NTBNP in the last 13866 hours.  No results for input(s): TSH in the last 30946 hours.  No results for input(s): INR in the last 69392 hours.     Jasmeet Chaves DO

## 2024-12-17 DIAGNOSIS — I44.7 LBBB (LEFT BUNDLE BRANCH BLOCK): ICD-10-CM

## 2024-12-17 DIAGNOSIS — I42.8 NONISCHEMIC CARDIOMYOPATHY (H): ICD-10-CM

## 2024-12-17 RX ORDER — METOPROLOL SUCCINATE 50 MG/1
50 TABLET, EXTENDED RELEASE ORAL DAILY
Qty: 90 TABLET | Refills: 3 | Status: SHIPPED | OUTPATIENT
Start: 2024-12-17

## 2025-02-11 ENCOUNTER — TELEPHONE (OUTPATIENT)
Dept: CARDIOLOGY | Facility: CLINIC | Age: 75
End: 2025-02-11
Payer: MEDICARE

## 2025-02-11 DIAGNOSIS — I42.8 NONISCHEMIC CARDIOMYOPATHY (H): Primary | ICD-10-CM

## 2025-02-11 DIAGNOSIS — I50.9 CHF WITH CARDIOMYOPATHY (H): ICD-10-CM

## 2025-02-11 DIAGNOSIS — I42.9 CHF WITH CARDIOMYOPATHY (H): ICD-10-CM

## 2025-02-11 NOTE — TELEPHONE ENCOUNTER
[facility] Call Center    Phone Message    May a detailed message be left on voicemail: yes     Reason for Call: Other: Pt is requesting a call back to discuss switching to valsartan from entresto due to expense.  Pt is also informing that she has been diagnosed at pre-diabetic     Action Taken: Other: cardio    Travel Screening: Not Applicable    Thank you!  [facility]       Date of Service:

## 2025-02-12 NOTE — TELEPHONE ENCOUNTER
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 Her BP's have been trending around 120/60. She would also like to pass along that she has been diagnosed as pre-diabetic. Comments/recommendations. Thank you. aa

## 2025-02-13 RX ORDER — VALSARTAN 40 MG/1
20 TABLET ORAL 2 TIMES DAILY
Qty: 90 TABLET | Refills: 2 | Status: SHIPPED | OUTPATIENT
Start: 2025-02-13

## 2025-02-13 NOTE — TELEPHONE ENCOUNTER
Spoke with pt regarding Dr. Chaves recommendation. Pt  verbalized understanding and is agreeable. Discussed possible side effects of Valsartan. Rx sent to preferred pharmacy. Encouraged pt to reach out should she need anything else. She had no other needs at this time. aa    --------------------------------------------  Msg received:  From: Jasmeet Chaves DO  Sent: 2/12/2025   7:39 PM CST  To: Sirisha Tripp RN    Switch to Valsartan 20 mg BID.

## 2025-02-23 ENCOUNTER — WALK IN (OUTPATIENT)
Dept: URGENT CARE | Age: 75
End: 2025-02-23

## 2025-02-23 VITALS
HEART RATE: 85 BPM | DIASTOLIC BLOOD PRESSURE: 80 MMHG | SYSTOLIC BLOOD PRESSURE: 149 MMHG | WEIGHT: 150 LBS | OXYGEN SATURATION: 97 % | BODY MASS INDEX: 24.11 KG/M2 | HEIGHT: 66 IN | RESPIRATION RATE: 18 BRPM | TEMPERATURE: 97.5 F

## 2025-02-23 DIAGNOSIS — S01.01XA LACERATION OF SCALP, INITIAL ENCOUNTER: Primary | ICD-10-CM

## 2025-02-23 RX ORDER — NICOTINE POLACRILEX 4 MG/1
20 GUM, CHEWING ORAL DAILY
COMMUNITY

## 2025-02-23 RX ORDER — VALSARTAN 40 MG/1
20 TABLET ORAL
COMMUNITY
Start: 2025-02-13

## 2025-02-23 RX ORDER — DULOXETIN HYDROCHLORIDE 30 MG/1
30 CAPSULE, DELAYED RELEASE ORAL EVERY MORNING
COMMUNITY

## 2025-02-23 RX ORDER — METOPROLOL SUCCINATE 25 MG/1
100 TABLET, EXTENDED RELEASE ORAL DAILY
COMMUNITY

## 2025-02-23 RX ORDER — OXYBUTYNIN CHLORIDE 10 MG/1
1 TABLET, EXTENDED RELEASE ORAL DAILY
COMMUNITY
Start: 2024-11-19

## 2025-02-23 RX ORDER — DULOXETIN HYDROCHLORIDE 60 MG/1
60 CAPSULE, DELAYED RELEASE ORAL EVERY MORNING
COMMUNITY

## 2025-02-23 RX ORDER — TRAZODONE HYDROCHLORIDE 50 MG/1
50 TABLET ORAL
COMMUNITY

## 2025-02-23 RX ORDER — ATORVASTATIN CALCIUM 10 MG/1
10 TABLET, FILM COATED ORAL DAILY
COMMUNITY

## 2025-02-23 RX ORDER — CLONAZEPAM 0.5 MG/1
0.5 TABLET ORAL DAILY PRN
COMMUNITY

## 2025-04-26 ENCOUNTER — MYC MEDICAL ADVICE (OUTPATIENT)
Dept: CARDIOLOGY | Facility: CLINIC | Age: 75
End: 2025-04-26
Payer: MEDICARE

## 2025-04-26 DIAGNOSIS — I42.8 NONISCHEMIC CARDIOMYOPATHY (H): ICD-10-CM

## 2025-04-26 DIAGNOSIS — I50.9 CHF WITH CARDIOMYOPATHY (H): ICD-10-CM

## 2025-04-26 DIAGNOSIS — I42.9 CHF WITH CARDIOMYOPATHY (H): ICD-10-CM

## 2025-04-28 RX ORDER — VALSARTAN 40 MG/1
20 TABLET ORAL 2 TIMES DAILY
Qty: 30 TABLET | Refills: 0 | Status: SHIPPED | OUTPATIENT
Start: 2025-04-28

## 2025-08-09 ENCOUNTER — HEALTH MAINTENANCE LETTER (OUTPATIENT)
Age: 75
End: 2025-08-09

## (undated) DEVICE — SU MONOCRYL 3-0 PS-2 18" UND MCP497G

## (undated) DEVICE — HOLDER LIMB VELCRO OR 0814-1533

## (undated) DEVICE — CUSTOM PACK TOTAL KNEE SOP5BTKHEC

## (undated) DEVICE — GLOVE BIOGEL INDICATOR 7.5 LF 41675

## (undated) DEVICE — SOL NACL 0.9% IRRIG 1000ML BOTTLE 2F7124

## (undated) DEVICE — SOL NACL 0.9% INJ 1000ML BAG 2B1324X

## (undated) DEVICE — CAST PADDING 6" STERILE 9046S

## (undated) DEVICE — GLOVE BIOGEL PI INDICATOR 8.0 LF 41680

## (undated) DEVICE — DECANTER VIAL 2006S

## (undated) DEVICE — GLOVE UNDER INDICATOR PI SZ 7.0 LF 41670

## (undated) DEVICE — GLOVE BIOGEL PI ULTRATOUCH G SZ 8.0 42180

## (undated) DEVICE — DRSG AQUACEL AG 3.5X9.75" HYDROFIBER 412011

## (undated) DEVICE — NEEDLE HYPO 21GA X 1-1/2 SAFETY 305917

## (undated) DEVICE — SUTURE VICRYL+ 1 18 CT/CR  VLT VCP753D

## (undated) DEVICE — SU DERMABOND PRINEO 22CM CLR222US

## (undated) DEVICE — SUTURE VICRYL+ 2-0 27IN CT-1 UND VCP259H

## (undated) DEVICE — GLOVE SURG PI ULTRA TOUCH M SZ 8-1/2 LF

## (undated) DEVICE — SUCTION MANIFOLD NEPTUNE 2 SYS 4 PORT 0702-020-000

## (undated) DEVICE — GLOVE BIOGEL PI SZ 7.0 40870

## (undated) DEVICE — SOL WATER IRRIG 1000ML BOTTLE 2F7114

## (undated) DEVICE — GLOVE UNDER INDICATOR PI SZ 8.5 LF 41685

## (undated) DEVICE — A3 SUPPLIES- SEE NURSING INFO PAGE

## (undated) DEVICE — CUSTOM PACK TOTAL KNEE ACCESSORY SOP5BTAHEA

## (undated) DEVICE — PLATE GROUNDING ADULT W/CORD 9165L

## (undated) DEVICE — BLADE SAW SAGITTAL STRK DUAL CUT 4118-135-090

## (undated) DEVICE — BANDAGE ELASTIC 6X550 LF DBL 593-96LF

## (undated) RX ORDER — DEXAMETHASONE SODIUM PHOSPHATE 10 MG/ML
INJECTION, EMULSION INTRAMUSCULAR; INTRAVENOUS
Status: DISPENSED
Start: 2023-09-06

## (undated) RX ORDER — PROPOFOL 10 MG/ML
INJECTION, EMULSION INTRAVENOUS
Status: DISPENSED
Start: 2023-09-06

## (undated) RX ORDER — BUPIVACAINE HYDROCHLORIDE 5 MG/ML
INJECTION, SOLUTION EPIDURAL; INTRACAUDAL
Status: DISPENSED
Start: 2023-09-06

## (undated) RX ORDER — ONDANSETRON 2 MG/ML
INJECTION INTRAMUSCULAR; INTRAVENOUS
Status: DISPENSED
Start: 2023-09-06